# Patient Record
Sex: MALE | Race: WHITE | NOT HISPANIC OR LATINO | Employment: FULL TIME | URBAN - METROPOLITAN AREA
[De-identification: names, ages, dates, MRNs, and addresses within clinical notes are randomized per-mention and may not be internally consistent; named-entity substitution may affect disease eponyms.]

---

## 2017-05-26 ENCOUNTER — TRANSFERRED RECORDS (OUTPATIENT)
Dept: HEALTH INFORMATION MANAGEMENT | Facility: CLINIC | Age: 57
End: 2017-05-26

## 2019-03-13 ENCOUNTER — OFFICE VISIT (OUTPATIENT)
Dept: INTERNAL MEDICINE | Facility: CLINIC | Age: 59
End: 2019-03-13
Payer: COMMERCIAL

## 2019-03-13 VITALS
HEIGHT: 68 IN | HEART RATE: 94 BPM | OXYGEN SATURATION: 96 % | DIASTOLIC BLOOD PRESSURE: 58 MMHG | RESPIRATION RATE: 18 BRPM | SYSTOLIC BLOOD PRESSURE: 104 MMHG | BODY MASS INDEX: 25.4 KG/M2 | TEMPERATURE: 98.5 F | WEIGHT: 167.6 LBS

## 2019-03-13 DIAGNOSIS — B02.9 HERPES ZOSTER WITHOUT COMPLICATION: Primary | ICD-10-CM

## 2019-03-13 PROCEDURE — 99203 OFFICE O/P NEW LOW 30 MIN: CPT | Performed by: NURSE PRACTITIONER

## 2019-03-13 RX ORDER — VALACYCLOVIR HYDROCHLORIDE 1 G/1
1000 TABLET, FILM COATED ORAL 3 TIMES DAILY
Qty: 21 TABLET | Refills: 0 | Status: SHIPPED | OUTPATIENT
Start: 2019-03-13 | End: 2019-04-01

## 2019-03-13 ASSESSMENT — MIFFLIN-ST. JEOR: SCORE: 1554.73

## 2019-03-13 NOTE — NURSING NOTE
"Vital signs:  Temp: 98.5  F (36.9  C) Temp src: Oral BP: 104/58 Pulse: 94   Resp: 18 SpO2: 96 %     Height: 172.7 cm (5' 8\") Weight: 76 kg (167 lb 9.6 oz)  Estimated body mass index is 25.48 kg/m  as calculated from the following:    Height as of this encounter: 1.727 m (5' 8\").    Weight as of this encounter: 76 kg (167 lb 9.6 oz).          "

## 2019-03-13 NOTE — PROGRESS NOTES
"  SUBJECTIVE:   Ryder Zepeda is a 58 year old male who presents to clinic today for the following health issues:        He is a new patient. He has shingles on his left side and notice the rash this morning.        Rash      Duration: 1 day    Description  Location: blistering rash L flank, abd, minor pain.  Since last week, vague fatigue, dull HA  Itching: no    Intensity:  moderate    Accompanying signs and symptoms: None    History (similar episodes/previous evaluation): None    Precipitating or alleviating factors:  New exposures:  None  Recent travel: no      Therapies tried and outcome: none      Problem list and histories reviewed & adjusted, as indicated.  Additional history: as documented        Reviewed and updated as needed this visit by clinical staff  Tobacco  Allergies  Meds  Med Hx  Surg Hx  Fam Hx  Soc Hx      Reviewed and updated as needed this visit by Provider         ROS:  Constitutional, HEENT, cardiovascular, pulmonary, gi and gu systems are negative, except as otherwise noted.    OBJECTIVE:     /58 (BP Location: Right arm, Patient Position: Sitting, Cuff Size: Adult Regular)   Pulse 94   Temp 98.5  F (36.9  C) (Oral)   Resp 18   Ht 1.727 m (5' 8\")   Wt 76 kg (167 lb 9.6 oz)   SpO2 96%   BMI 25.48 kg/m    Body mass index is 25.48 kg/m .  GENERAL: healthy, alert and no distress  SKIN: T11 distribution clusters pink vesicles and papules        ASSESSMENT/PLAN:               ICD-10-CM    1. Herpes zoster without complication B02.9 valACYclovir (VALTREX) 1000 mg tablet       NSAIDs, prn  Consider shingrix vaccine    Lelo Naylor NP  [facility]  "

## 2019-04-01 ENCOUNTER — OFFICE VISIT (OUTPATIENT)
Dept: FAMILY MEDICINE | Facility: CLINIC | Age: 59
End: 2019-04-01
Payer: COMMERCIAL

## 2019-04-01 VITALS
DIASTOLIC BLOOD PRESSURE: 70 MMHG | TEMPERATURE: 97.9 F | BODY MASS INDEX: 25.85 KG/M2 | OXYGEN SATURATION: 97 % | WEIGHT: 170 LBS | SYSTOLIC BLOOD PRESSURE: 124 MMHG | HEART RATE: 77 BPM

## 2019-04-01 DIAGNOSIS — B02.29 POSTHERPETIC NEURALGIA: Primary | ICD-10-CM

## 2019-04-01 PROCEDURE — 99213 OFFICE O/P EST LOW 20 MIN: CPT | Performed by: FAMILY MEDICINE

## 2019-04-01 RX ORDER — GABAPENTIN 300 MG/1
300 CAPSULE ORAL 3 TIMES DAILY
Qty: 90 CAPSULE | Refills: 3 | Status: SHIPPED | OUTPATIENT
Start: 2019-04-01 | End: 2019-10-09

## 2019-04-01 NOTE — PROGRESS NOTES
SUBJECTIVE:                                                    Ryder Zepeda is a 58 year old male who presents to clinic today for the following health issues:      Follow up for shingles - He was seen on 3/13/19 and diagnosed with shingled. He had developed a blistering rash on left flank. Treated with Valtrex. He states the rash is almost gone but has lots of numbness/itching/pain on left flank.    Problem list and histories reviewed & adjusted, as indicated.  Additional history: as documented    Patient Active Problem List   Diagnosis     Shingles     Past Surgical History:   Procedure Laterality Date     deviated septum         Social History     Tobacco Use     Smoking status: Never Smoker     Smokeless tobacco: Never Used   Substance Use Topics     Alcohol use: Yes     Comment: Once a week     Family History   Problem Relation Age of Onset     Leukemia Mother      Hypertension Father            ROS:  Constitutional, HEENT, cardiovascular, pulmonary, gi and gu systems are negative, except as otherwise noted.    OBJECTIVE:     /70   Pulse 77   Temp 97.9  F (36.6  C) (Oral)   Wt 77.1 kg (170 lb)   SpO2 97%   BMI 25.85 kg/m    Body mass index is 25.85 kg/m .  GENERAL: healthy, alert and no distress  SKIN: faint erythematous rash on left flank along T11 dermatome    Diagnostic Test Results:  none     ASSESSMENT/PLAN:   1. Postherpetic neuralgia: treat post-shingles pain with gabapentin. Follow up in one month to see how he is doing and for preventive visit.  - gabapentin (NEURONTIN) 300 MG capsule; Take 1 capsule (300 mg) by mouth 3 times daily  Dispense: 90 capsule; Refill: 3    Faheem Penn DO  [facility]

## 2019-06-19 NOTE — PROGRESS NOTES
"Subjective     Ryder Zepeda is a 59 year old male who presents to clinic today for the following health issues:      Concern - Shingles Follow up   Onset: DX on 03/13/19    Description:   Still having symptoms on left flank     Intensity: moderate    Progression of Symptoms:  improving    Accompanying Signs & Symptoms:  Painful, throbbing     Previous history of similar problem:   None     Precipitating factors:   Worsened by: without meds     Alleviating factors:  Improved by: gabapentin     Therapies Tried and outcome:   Gabapentin - helps but he doesn't like taking medication  CBD oil - no help  Capsaicin - made the area warm but didn't help much  Putting pressure on the area feels better.    If he is moving around and being active, doesn't notice the pain as much. He notices it more when sitting around or in bed.      Patient Active Problem List   Diagnosis     Shingles     Past Surgical History:   Procedure Laterality Date     deviated septum         Social History     Tobacco Use     Smoking status: Never Smoker     Smokeless tobacco: Never Used   Substance Use Topics     Alcohol use: Yes     Comment: Once a week     Family History   Problem Relation Age of Onset     Leukemia Mother      Hypertension Father            Reviewed and updated as needed this visit by Provider  Tobacco  Allergies  Meds  Problems  Med Hx  Surg Hx  Fam Hx         Review of Systems   ROS COMP: Constitutional, HEENT, cardiovascular, pulmonary, gi and gu systems are negative, except as otherwise noted.      Objective    /62   Pulse 73   Temp 98.3  F (36.8  C) (Oral)   Ht 1.727 m (5' 8\")   Wt 76.7 kg (169 lb)   SpO2 95%   BMI 25.70 kg/m    Body mass index is 25.7 kg/m .  Physical Exam   GENERAL: healthy, alert and no distress  SKIN: scattered hyperpigmented areas in region of shingles rash on left side    Diagnostic Test Results:  none         Assessment & Plan     1. Postherpetic neuralgia: discussed nature of PHN " and possible prolonged course of symptoms. Continue gabapentin.          Return in about 1 month (around 7/20/2019) for Preventive Visit.    Faheem Penn,   [facility]

## 2019-06-20 ENCOUNTER — OFFICE VISIT (OUTPATIENT)
Dept: FAMILY MEDICINE | Facility: CLINIC | Age: 59
End: 2019-06-20
Payer: COMMERCIAL

## 2019-06-20 VITALS
DIASTOLIC BLOOD PRESSURE: 62 MMHG | TEMPERATURE: 98.3 F | SYSTOLIC BLOOD PRESSURE: 100 MMHG | OXYGEN SATURATION: 95 % | BODY MASS INDEX: 25.61 KG/M2 | WEIGHT: 169 LBS | HEART RATE: 73 BPM | HEIGHT: 68 IN

## 2019-06-20 DIAGNOSIS — B02.29 POSTHERPETIC NEURALGIA: Primary | ICD-10-CM

## 2019-06-20 PROCEDURE — 99213 OFFICE O/P EST LOW 20 MIN: CPT | Performed by: FAMILY MEDICINE

## 2019-06-20 ASSESSMENT — MIFFLIN-ST. JEOR: SCORE: 1556.08

## 2019-10-09 DIAGNOSIS — B02.29 POSTHERPETIC NEURALGIA: ICD-10-CM

## 2019-10-09 RX ORDER — GABAPENTIN 300 MG/1
300 CAPSULE ORAL 3 TIMES DAILY
Qty: 90 CAPSULE | Refills: 2 | Status: SHIPPED | OUTPATIENT
Start: 2019-10-09 | End: 2023-01-26

## 2019-10-09 NOTE — TELEPHONE ENCOUNTER
gabapentin (NEURONTIN) 300 MG capsule      Last Written Prescription Date:  4/1/2019  Last Fill Quantity: 90 capsule,   # refills: 3  Last Office Visit: 6/20/2019 [facility]    Future Office visit:       Routing refill request to provider for review/approval because:  Drug not on the [facility], [facility] or [facility] refill protocol or controlled substance

## 2019-12-04 ENCOUNTER — HOSPITAL ENCOUNTER (EMERGENCY)
Facility: CLINIC | Age: 59
Discharge: HOME | End: 2019-12-04
Attending: EMERGENCY MEDICINE | Admitting: EMERGENCY MEDICINE
Payer: COMMERCIAL

## 2019-12-04 VITALS
OXYGEN SATURATION: 98 % | TEMPERATURE: 97.4 F | DIASTOLIC BLOOD PRESSURE: 82 MMHG | HEART RATE: 78 BPM | RESPIRATION RATE: 16 BRPM | SYSTOLIC BLOOD PRESSURE: 140 MMHG

## 2019-12-04 DIAGNOSIS — N20.0 KIDNEY STONE: ICD-10-CM

## 2019-12-04 LAB
ALBUMIN UR-MCNC: 10 MG/DL
ANION GAP SERPL CALCULATED.3IONS-SCNC: 8 MMOL/L (ref 3–14)
APPEARANCE UR: CLEAR
BASOPHILS # BLD AUTO: 0 10E9/L (ref 0–0.2)
BASOPHILS NFR BLD AUTO: 0.1 %
BILIRUB UR QL STRIP: NEGATIVE
BUN SERPL-MCNC: 19 MG/DL (ref 7–30)
CALCIUM SERPL-MCNC: 9 MG/DL (ref 8.5–10.1)
CHLORIDE SERPL-SCNC: 108 MMOL/L (ref 94–109)
CO2 SERPL-SCNC: 24 MMOL/L (ref 20–32)
COLOR UR AUTO: ABNORMAL
CREAT SERPL-MCNC: 1.14 MG/DL (ref 0.66–1.25)
DIFFERENTIAL METHOD BLD: ABNORMAL
EOSINOPHIL # BLD AUTO: 0.1 10E9/L (ref 0–0.7)
EOSINOPHIL NFR BLD AUTO: 0.5 %
ERYTHROCYTE [DISTWIDTH] IN BLOOD BY AUTOMATED COUNT: 12.3 % (ref 10–15)
GFR SERPL CREATININE-BSD FRML MDRD: 70 ML/MIN/{1.73_M2}
GLUCOSE SERPL-MCNC: 144 MG/DL (ref 70–99)
GLUCOSE UR STRIP-MCNC: NEGATIVE MG/DL
HCT VFR BLD AUTO: 41.3 % (ref 40–53)
HGB BLD-MCNC: 13.3 G/DL (ref 13.3–17.7)
HGB UR QL STRIP: NEGATIVE
IMM GRANULOCYTES # BLD: 0.1 10E9/L (ref 0–0.4)
IMM GRANULOCYTES NFR BLD: 0.4 %
KETONES UR STRIP-MCNC: ABNORMAL MG/DL
LEUKOCYTE ESTERASE UR QL STRIP: ABNORMAL
LYMPHOCYTES # BLD AUTO: 1.5 10E9/L (ref 0.8–5.3)
LYMPHOCYTES NFR BLD AUTO: 13.5 %
MCH RBC QN AUTO: 30.7 PG (ref 26.5–33)
MCHC RBC AUTO-ENTMCNC: 32.2 G/DL (ref 31.5–36.5)
MCV RBC AUTO: 95 FL (ref 78–100)
MONOCYTES # BLD AUTO: 0.8 10E9/L (ref 0–1.3)
MONOCYTES NFR BLD AUTO: 7.1 %
MUCOUS THREADS #/AREA URNS LPF: PRESENT /LPF
NEUTROPHILS # BLD AUTO: 8.8 10E9/L (ref 1.6–8.3)
NEUTROPHILS NFR BLD AUTO: 78.4 %
NITRATE UR QL: NEGATIVE
NRBC # BLD AUTO: 0 10*3/UL
NRBC BLD AUTO-RTO: 0 /100
PH UR STRIP: 7 PH (ref 5–7)
PLATELET # BLD AUTO: 337 10E9/L (ref 150–450)
POTASSIUM SERPL-SCNC: 4 MMOL/L (ref 3.4–5.3)
RBC # BLD AUTO: 4.33 10E12/L (ref 4.4–5.9)
RBC #/AREA URNS AUTO: 5 /HPF (ref 0–2)
SODIUM SERPL-SCNC: 140 MMOL/L (ref 133–144)
SOURCE: ABNORMAL
SP GR UR STRIP: 1.02 (ref 1–1.03)
UROBILINOGEN UR STRIP-MCNC: NORMAL MG/DL (ref 0–2)
WBC # BLD AUTO: 11.2 10E9/L (ref 4–11)
WBC #/AREA URNS AUTO: 2 /HPF (ref 0–5)

## 2019-12-04 PROCEDURE — 85025 COMPLETE CBC W/AUTO DIFF WBC: CPT | Performed by: EMERGENCY MEDICINE

## 2019-12-04 PROCEDURE — 96374 THER/PROPH/DIAG INJ IV PUSH: CPT

## 2019-12-04 PROCEDURE — 96375 TX/PRO/DX INJ NEW DRUG ADDON: CPT

## 2019-12-04 PROCEDURE — 80048 BASIC METABOLIC PNL TOTAL CA: CPT | Performed by: EMERGENCY MEDICINE

## 2019-12-04 PROCEDURE — 25000128 H RX IP 250 OP 636: Performed by: EMERGENCY MEDICINE

## 2019-12-04 PROCEDURE — 81001 URINALYSIS AUTO W/SCOPE: CPT | Performed by: EMERGENCY MEDICINE

## 2019-12-04 PROCEDURE — 96361 HYDRATE IV INFUSION ADD-ON: CPT

## 2019-12-04 PROCEDURE — 99285 EMERGENCY DEPT VISIT HI MDM: CPT | Mod: 25

## 2019-12-04 PROCEDURE — 25800030 ZZH RX IP 258 OP 636: Performed by: EMERGENCY MEDICINE

## 2019-12-04 RX ORDER — HYDROMORPHONE HYDROCHLORIDE 1 MG/ML
0.5 INJECTION, SOLUTION INTRAMUSCULAR; INTRAVENOUS; SUBCUTANEOUS
Status: DISCONTINUED | OUTPATIENT
Start: 2019-12-04 | End: 2019-12-04 | Stop reason: HOSPADM

## 2019-12-04 RX ORDER — ONDANSETRON 2 MG/ML
4 INJECTION INTRAMUSCULAR; INTRAVENOUS ONCE
Status: DISCONTINUED | OUTPATIENT
Start: 2019-12-04 | End: 2019-12-04 | Stop reason: HOSPADM

## 2019-12-04 RX ORDER — ONDANSETRON 2 MG/ML
4 INJECTION INTRAMUSCULAR; INTRAVENOUS ONCE
Status: COMPLETED | OUTPATIENT
Start: 2019-12-04 | End: 2019-12-04

## 2019-12-04 RX ORDER — HYDROCODONE BITARTRATE AND ACETAMINOPHEN 5; 325 MG/1; MG/1
1 TABLET ORAL EVERY 6 HOURS PRN
Qty: 10 TABLET | Refills: 0 | Status: SHIPPED | OUTPATIENT
Start: 2019-12-04 | End: 2019-12-07

## 2019-12-04 RX ORDER — KETOROLAC TROMETHAMINE 15 MG/ML
15 INJECTION, SOLUTION INTRAMUSCULAR; INTRAVENOUS ONCE
Status: COMPLETED | OUTPATIENT
Start: 2019-12-04 | End: 2019-12-04

## 2019-12-04 RX ADMIN — ONDANSETRON HYDROCHLORIDE 4 MG: 2 INJECTION, SOLUTION INTRAMUSCULAR; INTRAVENOUS at 01:42

## 2019-12-04 RX ADMIN — SODIUM CHLORIDE 1000 ML: 9 INJECTION, SOLUTION INTRAVENOUS at 01:42

## 2019-12-04 RX ADMIN — HYDROMORPHONE HYDROCHLORIDE 0.5 MG: 1 INJECTION, SOLUTION INTRAMUSCULAR; INTRAVENOUS; SUBCUTANEOUS at 01:42

## 2019-12-04 RX ADMIN — KETOROLAC TROMETHAMINE 15 MG: 15 INJECTION, SOLUTION INTRAMUSCULAR; INTRAVENOUS at 01:42

## 2019-12-04 ASSESSMENT — ENCOUNTER SYMPTOMS
VOMITING: 1
FLANK PAIN: 1
HEMATURIA: 0
DYSURIA: 0
NAUSEA: 1

## 2019-12-04 NOTE — ED AVS SNAPSHOT
[facility] Emergency Department  [address]  [facility] [address]  Phone:  [phone]  Fax:  [phone]                                    Ryder Zepeda   MRN: [id number]    Department:  [facility] Emergency Department   Date of Visit:  12/4/2019           After Visit Summary Signature Page    I have received my discharge instructions, and my questions have been answered. I have discussed any challenges I see with this plan with the nurse or doctor.    ..........................................................................................................................................  Patient/Patient Representative Signature      ..........................................................................................................................................  Patient Representative Print Name and Relationship to Patient    ..................................................               ................................................  Date                                   Time    ..........................................................................................................................................  Reviewed by Signature/Title    ...................................................              ..............................................  Date                                               Time          22EPIC Rev 08/18

## 2019-12-04 NOTE — ED PROVIDER NOTES
History     Chief Complaint:  Flank Pain    The history is provided by the patient and the spouse.      Ryder Zepeda is a 59 year old male with a history of kidney stones who presents to the emergency department today for evaluation of right sided flank pain. The patient reports around 2100 tonight he had an onset of right sided flank pain. He states he has had decreased urination but denies any dysuria or hematuria. He has also been nauseated and vomited a few times. His wife adds that the patient has not felt well all week, and tonight he appeared pale. The patient reports his last kidney stone was approximately ten years ago and passed on his own, he states today's symptoms feel reminiscent of his previous stones.    Allergies:  No Known Drug Allergies     Medications:    Gabapentin  Lansoprazole     Past Medical History:    Shingles  Calculus of kidney    Past Surgical History:    Deviated septum    Family History:    Stroke   Leukemia  Hypertension     Social History:  The patient was accompanied to the ED by his wife.  Smoking Status: Never Smoker  Smokeless Tobacco: Never Used  Alcohol Use: Positive, 1/week  Drug Use: Negative    Marital Status:       Review of Systems   Gastrointestinal: Positive for nausea and vomiting.   Genitourinary: Positive for decreased urine volume and flank pain (right). Negative for dysuria and hematuria.   Skin: Positive for pallor (resolved).   All other systems reviewed and are negative.    Physical Exam     Patient Vitals for the past 24 hrs:   BP Temp Temp src Pulse Heart Rate Resp SpO2   12/04/19 0142 (!) 140/82 -- -- 78 78 16 98 %   12/04/19 0128 (!) 138/90 -- -- -- -- -- --   12/04/19 0127 -- 97.4  F (36.3  C) Temporal 89 89 18 95 %      Physical Exam    General: Patient is alert and interactive when I enter the room, comfortable appearing   Head:  The scalp, face, and head appear normal  Eyes:  Conjunctivae are normal  ENT:    The nose is normal    Pinnae are  normal    External acoustic canals are normal  Neck:  Trachea midline  CV:  Pulses are normal.    Resp:  No respiratory distress   Abdomen:      Soft, non-tender, non-distended  Musc:  Normal muscular tone    No major joint effusions    No asymmetric leg swelling  Skin:  No rash or lesions noted  Neuro:  Speech is normal and fluent. Face is symmetric.     Moving all extremities well.   Psych: Awake. Alert.  Normal affect.  Appropriate interactions.    Emergency Department Course     Laboratory:  Laboratory findings were communicated with the patient and family who voiced understanding of the findings.  CBC: WNL (WBC 11.2, HGB 13.3, )  BMP: Glucose 144 (H). O/w WNL (Creatinine 1.14)     UA with Microscopic: Clear, light yellow urine with trace ketones, protein albumin of 10, small Leukocyte esterase, RBC of 5 (H), and mucous present. O/w WNL.       Interventions:  0142 Dilaudid 0.5 mg IV   0142 Toradol 15 mg IV  0142 Zofran 4 mg IV  0142 0.9% NaCl 1L IV     Emergency Department Course:  0146 IV was inserted and blood was drawn for laboratory testing, results above.   0202 Nursing notes and vitals reviewed.  0214 I performed an exam of the patient as documented above.   0235 The patient provided a urine sample here in the emergency department. This was sent for laboratory testing, findings above.   0356 Patient was rechecked and updated with laboratory results, discussed discharge.    Findings and plan explained to the Patient and spouse. Patient discharged home with instructions regarding supportive care, medications, and reasons to return. The importance of close follow-up was reviewed. The patient was prescribed Hydrocodone-Acetaminophen.     I personally reviewed the laboratory results with the Patient and spouse and answered all related questions prior to discharge.      Impression & Plan      Medical Decision Making:  Ryder Zepeda is a 59 year old male who presented with right flank pain consistent  with renal colic. Renal function is normal/baseline. Given that his pain is controlled and stay controlled, do not think patient needs imaging and patient was comfortable with this plan. Lab workup and history show no other alternative etiology that could be causing his symptoms (e.g., AAA, appendicitis, pyelonephritis). There is no fever or convincing evidence of a urinary tract infection. On recheck, his pain is controlled with interventions in the ED and he is tolerating POs. I will prescribe supportive medications to control pain. I have advised him to return for uncontrolled pain, vomiting, fever, or any other concerning symptoms. I also advised to strain his urine to look for a stone.  Finally, I have advised follow up with urology within 3-5 days.      Diagnosis:    ICD-10-CM    1. Kidney stone N20.0        Disposition:  The patient is discharged to home.     Discharge Medications:  New Prescriptions    HYDROCODONE-ACETAMINOPHEN (NORCO) 5-325 MG TABLET    Take 1 tablet by mouth every 6 hours as needed for severe pain       Scribe Disclosure:  IMarlen, am serving as a scribe at 2:09 AM on 12/4/2019 to document services personally performed by Sharmin Wing MD based on my observations and the provider's statements to me.    12/4/2019   [facility] EMERGENCY DEPARTMENT       Sharmin Wing MD  12/04/19 8011

## 2020-10-08 NOTE — PROGRESS NOTES
Subjective     Ryder Zepeda is a 60 year old male who presents to clinic today for the following health issues:    History of Present Illness       Back Pain:  He presents for follow up of back pain. Patient's back pain is a new problem.    Original cause of back pain: not sure  First noticed back pain: in the last week  Patient feels back pain: comes and goesLocation of back pain:  Left lower back  Description of back pain: dull ache  Back pain spreads: nowhere    Since patient first noticed back pain, pain is: unchanged  Does back pain interfere with his job:  No  On a scale of 1-10 (10 being the worst), patient describes pain as:  3  What makes back pain worse: other  Acupuncture: not tried  Acetaminophen: helpful  Activity or exercise: not tried  Chiropractor:  Not tried  Cold: not tried  Heat: not tried  Massage: not tried  Muscle relaxants: not tried  NSAIDS: helpful  Opioids: not tried  Physical Therapy: not tried  Rest: helpful  Steroid Injection: not tried  Stretching: not tried  Surgery: not tried  TENS unit: not tried  Topical pain relievers: not tried  Other healthcare providers patient is seeing for back pain: None    He eats 2-3 servings of fruits and vegetables daily.He consumes 2 sweetened beverage(s) daily.He exercises with enough effort to increase his heart rate 10 to 19 minutes per day.  He exercises with enough effort to increase his heart rate 3 or less days per week.   He is taking medications regularly.           Started having pain in his left hip about 2 weeks ago. He decreased dairy products in his diet and still had hip pain and bilateral foot pain. Now he is having middle low back pain. Hip and feet pain are resolved. No change in activity. Feels more pain if he is lifting more weight.       Review of Systems   Constitutional, HEENT, cardiovascular, pulmonary, gi and gu systems are negative, except as otherwise noted.      Objective    /80 (BP Location: Right arm, Cuff Size:  "Adult Regular)   Pulse 81   Temp 98.1  F (36.7  C) (Tympanic)   Ht 1.727 m (5' 8\")   Wt 74.8 kg (165 lb)   SpO2 98%   BMI 25.09 kg/m    Body mass index is 25.09 kg/m .  Physical Exam   GENERAL: healthy, alert and no distress  Comprehensive back pain exam:  No tenderness, Range of motion not limited by pain, Lower extremity strength functional and equal on both sides and Straight leg raise negative bilaterally          Assessment & Plan     Acute right-sided low back pain without sciatica: seems muscular in nature. Discussed conservative management.  If any persistence or worsening of pain, consider imaging and or referral to physical therapy.    Urinary frequency: unclear if related to low back pain but doesn't seem likely. Will check blood work, urine for further evaluatoin.  - *UA reflex to Microscopic and Culture ([place] and [facility] (except [place] and [place])  - Comprehensive metabolic panel (BMP + Alb, Alk Phos, ALT, AST, Total. Bili, TP)  - CBC with platelets  - Hemoglobin A1c  - PSA, screen  - Urine Microscopic    Need for prophylactic vaccination and inoculation against influenza  - INFLUENZA QUAD, RECOMBINANT, P-FREE (RIV4) (FLUBLOCK) [84219]  - Vaccine Administration, Initial [49953]     BMI:   Estimated body mass index is 25.09 kg/m  as calculated from the following:    Height as of this encounter: 1.727 m (5' 8\").    Weight as of this encounter: 74.8 kg (165 lb).            Return if symptoms worsen or fail to improve.    Faheem Penn DO  [facility]    "

## 2020-10-09 ENCOUNTER — OFFICE VISIT (OUTPATIENT)
Dept: FAMILY MEDICINE | Facility: CLINIC | Age: 60
End: 2020-10-09
Payer: COMMERCIAL

## 2020-10-09 VITALS
DIASTOLIC BLOOD PRESSURE: 80 MMHG | HEIGHT: 68 IN | WEIGHT: 165 LBS | HEART RATE: 81 BPM | OXYGEN SATURATION: 98 % | TEMPERATURE: 98.1 F | BODY MASS INDEX: 25.01 KG/M2 | SYSTOLIC BLOOD PRESSURE: 112 MMHG

## 2020-10-09 DIAGNOSIS — R35.0 URINARY FREQUENCY: ICD-10-CM

## 2020-10-09 DIAGNOSIS — Z23 NEED FOR PROPHYLACTIC VACCINATION AND INOCULATION AGAINST INFLUENZA: ICD-10-CM

## 2020-10-09 DIAGNOSIS — M54.50 ACUTE RIGHT-SIDED LOW BACK PAIN WITHOUT SCIATICA: Primary | ICD-10-CM

## 2020-10-09 LAB
ALBUMIN SERPL-MCNC: 3.7 G/DL (ref 3.4–5)
ALBUMIN UR-MCNC: ABNORMAL MG/DL
ALP SERPL-CCNC: 92 U/L (ref 40–150)
ALT SERPL W P-5'-P-CCNC: 28 U/L (ref 0–70)
ANION GAP SERPL CALCULATED.3IONS-SCNC: 6 MMOL/L (ref 3–14)
APPEARANCE UR: CLEAR
AST SERPL W P-5'-P-CCNC: 21 U/L (ref 0–45)
BACTERIA #/AREA URNS HPF: ABNORMAL /HPF
BILIRUB SERPL-MCNC: 0.3 MG/DL (ref 0.2–1.3)
BILIRUB UR QL STRIP: NEGATIVE
BUN SERPL-MCNC: 12 MG/DL (ref 7–30)
CALCIUM SERPL-MCNC: 9.2 MG/DL (ref 8.5–10.1)
CHLORIDE SERPL-SCNC: 108 MMOL/L (ref 94–109)
CO2 SERPL-SCNC: 24 MMOL/L (ref 20–32)
COLOR UR AUTO: YELLOW
CREAT SERPL-MCNC: 0.94 MG/DL (ref 0.66–1.25)
ERYTHROCYTE [DISTWIDTH] IN BLOOD BY AUTOMATED COUNT: 12.2 % (ref 10–15)
GFR SERPL CREATININE-BSD FRML MDRD: 87 ML/MIN/{1.73_M2}
GLUCOSE SERPL-MCNC: 90 MG/DL (ref 70–99)
GLUCOSE UR STRIP-MCNC: NEGATIVE MG/DL
HBA1C MFR BLD: 5 % (ref 0–5.6)
HCT VFR BLD AUTO: 42.1 % (ref 40–53)
HGB BLD-MCNC: 13.9 G/DL (ref 13.3–17.7)
HGB UR QL STRIP: ABNORMAL
KETONES UR STRIP-MCNC: NEGATIVE MG/DL
LEUKOCYTE ESTERASE UR QL STRIP: NEGATIVE
MCH RBC QN AUTO: 31.3 PG (ref 26.5–33)
MCHC RBC AUTO-ENTMCNC: 33 G/DL (ref 31.5–36.5)
MCV RBC AUTO: 95 FL (ref 78–100)
MUCOUS THREADS #/AREA URNS LPF: PRESENT /LPF
NITRATE UR QL: NEGATIVE
NON-SQ EPI CELLS #/AREA URNS LPF: ABNORMAL /LPF
PH UR STRIP: 6 PH (ref 5–7)
PLATELET # BLD AUTO: 308 10E9/L (ref 150–450)
POTASSIUM SERPL-SCNC: 4.3 MMOL/L (ref 3.4–5.3)
PROT SERPL-MCNC: 7.7 G/DL (ref 6.8–8.8)
PSA SERPL-ACNC: 1.41 UG/L (ref 0–4)
RBC # BLD AUTO: 4.44 10E12/L (ref 4.4–5.9)
RBC #/AREA URNS AUTO: ABNORMAL /HPF
SODIUM SERPL-SCNC: 138 MMOL/L (ref 133–144)
SOURCE: ABNORMAL
SP GR UR STRIP: 1.02 (ref 1–1.03)
SPERM #/AREA URNS HPF: PRESENT /HPF
UROBILINOGEN UR STRIP-ACNC: 0.2 EU/DL (ref 0.2–1)
WBC # BLD AUTO: 6.9 10E9/L (ref 4–11)
WBC #/AREA URNS AUTO: ABNORMAL /HPF

## 2020-10-09 PROCEDURE — 83036 HEMOGLOBIN GLYCOSYLATED A1C: CPT | Performed by: FAMILY MEDICINE

## 2020-10-09 PROCEDURE — G0103 PSA SCREENING: HCPCS | Performed by: FAMILY MEDICINE

## 2020-10-09 PROCEDURE — 99213 OFFICE O/P EST LOW 20 MIN: CPT | Mod: 25 | Performed by: FAMILY MEDICINE

## 2020-10-09 PROCEDURE — 80053 COMPREHEN METABOLIC PANEL: CPT | Performed by: FAMILY MEDICINE

## 2020-10-09 PROCEDURE — 90471 IMMUNIZATION ADMIN: CPT | Performed by: FAMILY MEDICINE

## 2020-10-09 PROCEDURE — 36415 COLL VENOUS BLD VENIPUNCTURE: CPT | Performed by: FAMILY MEDICINE

## 2020-10-09 PROCEDURE — 90682 RIV4 VACC RECOMBINANT DNA IM: CPT | Performed by: FAMILY MEDICINE

## 2020-10-09 PROCEDURE — 81001 URINALYSIS AUTO W/SCOPE: CPT | Performed by: FAMILY MEDICINE

## 2020-10-09 PROCEDURE — 85027 COMPLETE CBC AUTOMATED: CPT | Performed by: FAMILY MEDICINE

## 2020-10-09 ASSESSMENT — MIFFLIN-ST. JEOR: SCORE: 1532.94

## 2020-10-09 NOTE — LETTER
October 12, 2020      Ryder Zepeda  [address] [place] [id number]        Dear ,    We are writing to inform you of your test results.    -Normal red blood cell (hgb) levels, normal white blood cell count and normal platelet levels.   -PSA (prostate specific antigen) test is normal.  This indicates a low likelihood of prostate cancer.  ADVISE: rechecking this in 1 year.   -Liver and gallbladder tests are normal (ALT,AST, Alk phos, bilirubin), kidney function is normal (Cr, GFR), sodium is normal, potassium is normal, calcium is normal, glucose is normal.   -A1C (diabetic test) is normal and indicates that your blood sugar has been in a normal range the last 3 months.     Resulted Orders   *UA reflex to Microscopic and Culture ([place] and [facility] (except [place] and [place])   Result Value Ref Range    Color Urine Yellow     Appearance Urine Clear     Glucose Urine Negative NEG^Negative mg/dL    Bilirubin Urine Negative NEG^Negative    Ketones Urine Negative NEG^Negative mg/dL    Specific Gravity Urine 1.020 1.003 - 1.035    Blood Urine Small (A) NEG^Negative    pH Urine 6.0 5.0 - 7.0 pH    Protein Albumin Urine Trace (A) NEG^Negative mg/dL    Urobilinogen Urine 0.2 0.2 - 1.0 EU/dL    Nitrite Urine Negative NEG^Negative    Leukocyte Esterase Urine Negative NEG^Negative    Source Midstream Urine    Comprehensive metabolic panel (BMP + Alb, Alk Phos, ALT, AST, Total. Bili, TP)   Result Value Ref Range    Sodium 138 133 - 144 mmol/L    Potassium 4.3 3.4 - 5.3 mmol/L    Chloride 108 94 - 109 mmol/L    Carbon Dioxide 24 20 - 32 mmol/L    Anion Gap 6 3 - 14 mmol/L    Glucose 90 70 - 99 mg/dL    Urea Nitrogen 12 7 - 30 mg/dL    Creatinine 0.94 0.66 - 1.25 mg/dL    GFR Estimate 87 >60 mL/min/[1.73_m2]      Comment:      Non  GFR Calc  Starting 12/18/2018, serum creatinine based estimated GFR (eGFR) will be   calculated using the Chronic Kidney Disease Epidemiology Collaboration    (CKD-EPI) equation.      GFR Estimate If Black >90 >60 mL/min/[1.73_m2]      Comment:       GFR Calc  Starting 12/18/2018, serum creatinine based estimated GFR (eGFR) will be   calculated using the Chronic Kidney Disease Epidemiology Collaboration   (CKD-EPI) equation.      Calcium 9.2 8.5 - 10.1 mg/dL    Bilirubin Total 0.3 0.2 - 1.3 mg/dL    Albumin 3.7 3.4 - 5.0 g/dL    Protein Total 7.7 6.8 - 8.8 g/dL    Alkaline Phosphatase 92 40 - 150 U/L    ALT 28 0 - 70 U/L    AST 21 0 - 45 U/L   CBC with platelets   Result Value Ref Range    WBC 6.9 4.0 - 11.0 10e9/L    RBC Count 4.44 4.4 - 5.9 10e12/L    Hemoglobin 13.9 13.3 - 17.7 g/dL    Hematocrit 42.1 40.0 - 53.0 %    MCV 95 78 - 100 fl    MCH 31.3 26.5 - 33.0 pg    MCHC 33.0 31.5 - 36.5 g/dL    RDW 12.2 10.0 - 15.0 %    Platelet Count 308 150 - 450 10e9/L   Hemoglobin A1c   Result Value Ref Range    Hemoglobin A1C 5.0 0 - 5.6 %      Comment:      Normal <5.7% Prediabetes 5.7-6.4%  Diabetes 6.5% or higher - adopted from ADA   consensus guidelines.     PSA, screen   Result Value Ref Range    PSA 1.41 0 - 4 ug/L      Comment:      Assay Method:  Chemiluminescence using Siemens Vista analyzer   Urine Microscopic   Result Value Ref Range    WBC Urine 0 - 5 OTO5^0 - 5 /HPF    RBC Urine O - 2 OTO2^O - 2 /HPF    Squamous Epithelial /LPF Urine Few FEW^Few /LPF    Bacteria Urine Few (A) NEG^Negative /HPF    Mucous Urine Present (A) NEG^Negative /LPF    sperm Present (A) NEG^Negative /HPF       If you have any questions or concerns, please call the clinic at the number listed above.       Sincerely,        Faheem Penn, DO

## 2023-01-26 ENCOUNTER — TELEPHONE (OUTPATIENT)
Dept: FAMILY MEDICINE | Facility: CLINIC | Age: 63
End: 2023-01-26

## 2023-01-26 ENCOUNTER — OFFICE VISIT (OUTPATIENT)
Dept: FAMILY MEDICINE | Facility: CLINIC | Age: 63
End: 2023-01-26
Payer: COMMERCIAL

## 2023-01-26 VITALS
HEIGHT: 67 IN | DIASTOLIC BLOOD PRESSURE: 60 MMHG | TEMPERATURE: 96.9 F | OXYGEN SATURATION: 98 % | HEART RATE: 77 BPM | BODY MASS INDEX: 25.9 KG/M2 | RESPIRATION RATE: 15 BRPM | SYSTOLIC BLOOD PRESSURE: 118 MMHG | WEIGHT: 165 LBS

## 2023-01-26 DIAGNOSIS — Z01.818 PREOPERATIVE EXAMINATION: Primary | ICD-10-CM

## 2023-01-26 DIAGNOSIS — Z13.220 SCREENING FOR HYPERLIPIDEMIA: ICD-10-CM

## 2023-01-26 DIAGNOSIS — L30.9 ECZEMA, UNSPECIFIED TYPE: ICD-10-CM

## 2023-01-26 DIAGNOSIS — Z86.0100 HISTORY OF COLONIC POLYPS: ICD-10-CM

## 2023-01-26 DIAGNOSIS — Z12.5 SCREENING FOR PROSTATE CANCER: ICD-10-CM

## 2023-01-26 DIAGNOSIS — Z13.1 SCREENING FOR DIABETES MELLITUS: ICD-10-CM

## 2023-01-26 DIAGNOSIS — K57.30 DIVERTICULAR DISEASE OF LARGE INTESTINE: ICD-10-CM

## 2023-01-26 PROBLEM — E78.5 HYPERLIPIDEMIA: Status: ACTIVE | Noted: 2023-01-26

## 2023-01-26 PROCEDURE — 99214 OFFICE O/P EST MOD 30 MIN: CPT | Performed by: FAMILY MEDICINE

## 2023-01-26 RX ORDER — TRIAMCINOLONE ACETONIDE 1 MG/G
OINTMENT TOPICAL 2 TIMES DAILY
Qty: 80 G | Refills: 1 | Status: SHIPPED | OUTPATIENT
Start: 2023-01-26 | End: 2023-01-26

## 2023-01-26 RX ORDER — TRIAMCINOLONE ACETONIDE 1 MG/G
OINTMENT TOPICAL 2 TIMES DAILY
Qty: 80 G | Refills: 1 | Status: SHIPPED | OUTPATIENT
Start: 2023-01-26 | End: 2023-02-09

## 2023-01-26 NOTE — TELEPHONE ENCOUNTER
[facility] pharmacy calling asking for clarification re: Kenalog cream. They need either an estimated time frame or an area to be applied. Please advise.    Hernan Hong RN [facility]

## 2023-01-26 NOTE — PROGRESS NOTES
[address] [address]  Phone: [phone]  Primary Provider: Faheem Penn  Pre-op Performing Provider: FAHEEM PENN      PREOPERATIVE EVALUATION:  Today's date: 1/26/2023    Ryder Zepeda is a 62 year old male who presents for a preoperative evaluation.      Surgical Information:  Surgery/Procedure: Colonoscopy  Surgery Location: [facility]  Surgeon:   Surgery Date: 02/03/2023  Time of Surgery: 8:30 AM  Where patient plans to recover: At home with family  Fax number for surgical facility: [facility]    Type of Anesthesia Anticipated: to be determined    Assessment & Plan     The proposed surgical procedure is considered LOW risk.    Preoperative examination    History of colonic polyps    Diverticular disease of large intestine    Eczema, unspecified type       Risks and Recommendations:  The patient has the following additional risks and recommendations for perioperative complications:   - No identified additional risk factors other than previously addressed    Medication Instructions:  Patient is on no chronic medications    RECOMMENDATION:  APPROVAL GIVEN to proceed with proposed procedure, without further diagnostic evaluation.      Subjective     HPI related to upcoming procedure: history of colon polpys    Preop Questions 1/26/2023   1. Have you ever had a heart attack or stroke? No   2. Have you ever had surgery on your heart or blood vessels, such as a stent placement, a coronary artery bypass, or surgery on an artery in your head, neck, heart, or legs? No   3. Do you have chest pain with activity? No   4. Do you have a history of  heart failure? No   5. Do you currently have a cold, bronchitis or symptoms of other infection? No   6. Do you have a cough, shortness of breath, or wheezing? No   7. Do you or anyone in your family have previous history of blood clots? No   8. Do you or does anyone in your family have a serious  bleeding problem such as prolonged bleeding following surgeries or cuts? No   9. Have you ever had problems with anemia or been told to take iron pills? No   10. Have you had any abnormal blood loss such as black, tarry or bloody stools? No   11. Have you ever had a blood transfusion? No   12. Are you willing to have a blood transfusion if it is medically needed before, during, or after your surgery? Yes   13. Have you or any of your relatives ever had problems with anesthesia? No   14. Do you have sleep apnea, excessive snoring or daytime drowsiness? No   15. Do you have any artifical heart valves or other implanted medical devices like a pacemaker, defibrillator, or continuous glucose monitor? No   16. Do you have artificial joints? No   17. Are you allergic to latex? No       Health Care Directive:  Patient does not have a Health Care Directive or Living Will: Patient states has Advance Directive and will bring in a copy to clinic.    Preoperative Review of :   reviewed - no record of controlled substances prescribed.      Status of Chronic Conditions:  See problem list for active medical problems.  Problems all longstanding and stable, except as noted/documented.  See ROS for pertinent symptoms related to these conditions.      Review of Systems  CONSTITUTIONAL: NEGATIVE for fever, chills, change in weight  INTEGUMENTARY/SKIN: NEGATIVE for worrisome rashes, moles or lesions  EYES: NEGATIVE for vision changes or irritation  ENT/MOUTH: NEGATIVE for ear, mouth and throat problems  RESP: NEGATIVE for significant cough or SOB  CV: NEGATIVE for chest pain, palpitations or peripheral edema  GI: NEGATIVE for nausea, abdominal pain, heartburn, or change in bowel habits  : NEGATIVE for frequency, dysuria, or hematuria  MUSCULOSKELETAL: NEGATIVE for significant arthralgias or myalgia  NEURO: NEGATIVE for weakness, dizziness or paresthesias  ENDOCRINE: NEGATIVE for temperature intolerance, skin/hair changes  HEME:  "NEGATIVE for bleeding problems  PSYCHIATRIC: NEGATIVE for changes in mood or affect    Patient Active Problem List    Diagnosis Date Noted     Hyperlipidemia 01/26/2023     Priority: Medium     Formatting of this note might be different from the original.  FLP 3/7/07: , , LDL 48,        Shingles      Priority: Medium     History of colonic polyps 05/26/2017     Priority: Medium     Diverticular disease of large intestine 05/26/2017     Priority: Medium      Past Medical History:   Diagnosis Date     Shingles      Past Surgical History:   Procedure Laterality Date     deviated septum       Current Outpatient Medications   Medication Sig Dispense Refill     Lansoprazole (PREVACID PO) Take 1 tablet by mouth daily as needed         No Known Allergies     Social History     Tobacco Use     Smoking status: Never     Smokeless tobacco: Never   Substance Use Topics     Alcohol use: Yes     Comment: Once a week     Family History   Problem Relation Age of Onset     Leukemia Mother      Hypertension Father      History   Drug Use No         Objective     /60 (BP Location: Right arm, Patient Position: Sitting, Cuff Size: Adult Regular)   Pulse 77   Temp 96.9  F (36.1  C) (Tympanic)   Resp 15   Ht 1.708 m (5' 7.25\")   Wt 74.8 kg (165 lb)   SpO2 98%   BMI 25.65 kg/m      Physical Exam    GENERAL APPEARANCE: healthy, alert and no distress     EYES: EOMI,  PERRL     HENT: ear canals and TM's normal and nose and mouth without ulcers or lesions     NECK: no adenopathy, no asymmetry, masses, or scars and thyroid normal to palpation     RESP: lungs clear to auscultation - no rales, rhonchi or wheezes     CV: regular rates and rhythm, normal S1 S2, no S3 or S4 and no murmur, click or rub     MS: extremities normal- no gross deformities noted, no evidence of inflammation in joints, FROM in all extremities.     SKIN: no suspicious lesions or rashes     NEURO: Normal strength and tone, sensory exam grossly " normal, mentation intact and speech normal     PSYCH: mentation appears normal. and affect normal/bright     LYMPHATICS: No cervical adenopathy      Diagnostics:  No labs were ordered during this visit.   No EKG required, no history of coronary heart disease, significant arrhythmia, peripheral arterial disease or other structural heart disease.    Revised Cardiac Risk Index (RCRI):  The patient has the following serious cardiovascular risks for perioperative complications:   - No serious cardiac risks = 0 points     RCRI Interpretation: 0 points: Class I (very low risk - 0.4% complication rate)           Signed Electronically by: Faheem Penn DO  Copy of this evaluation report is provided to requesting physician.

## 2023-02-03 ENCOUNTER — TRANSFERRED RECORDS (OUTPATIENT)
Dept: HEALTH INFORMATION MANAGEMENT | Facility: CLINIC | Age: 63
End: 2023-02-03
Payer: COMMERCIAL

## 2023-07-25 ENCOUNTER — TELEPHONE (OUTPATIENT)
Dept: FAMILY MEDICINE | Facility: CLINIC | Age: 63
End: 2023-07-25

## 2023-07-25 ENCOUNTER — HOSPITAL ENCOUNTER (OUTPATIENT)
Dept: CT IMAGING | Facility: CLINIC | Age: 63
Discharge: HOME | End: 2023-07-25
Attending: FAMILY MEDICINE | Admitting: FAMILY MEDICINE
Payer: COMMERCIAL

## 2023-07-25 ENCOUNTER — OFFICE VISIT (OUTPATIENT)
Dept: FAMILY MEDICINE | Facility: CLINIC | Age: 63
End: 2023-07-25
Payer: COMMERCIAL

## 2023-07-25 VITALS
BODY MASS INDEX: 25.03 KG/M2 | TEMPERATURE: 98.4 F | OXYGEN SATURATION: 98 % | DIASTOLIC BLOOD PRESSURE: 60 MMHG | RESPIRATION RATE: 15 BRPM | WEIGHT: 161 LBS | HEART RATE: 82 BPM | SYSTOLIC BLOOD PRESSURE: 116 MMHG

## 2023-07-25 DIAGNOSIS — R10.9 FLANK PAIN: ICD-10-CM

## 2023-07-25 DIAGNOSIS — R31.29 MICROSCOPIC HEMATURIA: ICD-10-CM

## 2023-07-25 DIAGNOSIS — R10.9 FLANK PAIN: Primary | ICD-10-CM

## 2023-07-25 DIAGNOSIS — R39.9 UTI SYMPTOMS: ICD-10-CM

## 2023-07-25 LAB
ALBUMIN UR-MCNC: 30 MG/DL
APPEARANCE UR: CLEAR
BACTERIA #/AREA URNS HPF: ABNORMAL /HPF
BILIRUB UR QL STRIP: NEGATIVE
COLOR UR AUTO: YELLOW
ERYTHROCYTE [DISTWIDTH] IN BLOOD BY AUTOMATED COUNT: 12.4 % (ref 10–15)
GLUCOSE UR STRIP-MCNC: NEGATIVE MG/DL
HCT VFR BLD AUTO: 39.6 % (ref 40–53)
HGB BLD-MCNC: 13.3 G/DL (ref 13.3–17.7)
HGB UR QL STRIP: ABNORMAL
KETONES UR STRIP-MCNC: NEGATIVE MG/DL
LEUKOCYTE ESTERASE UR QL STRIP: NEGATIVE
MCH RBC QN AUTO: 31.4 PG (ref 26.5–33)
MCHC RBC AUTO-ENTMCNC: 33.6 G/DL (ref 31.5–36.5)
MCV RBC AUTO: 94 FL (ref 78–100)
NITRATE UR QL: NEGATIVE
PH UR STRIP: 6 [PH] (ref 5–7)
PLATELET # BLD AUTO: 314 10E3/UL (ref 150–450)
RBC # BLD AUTO: 4.23 10E6/UL (ref 4.4–5.9)
RBC #/AREA URNS AUTO: ABNORMAL /HPF
SP GR UR STRIP: 1.02 (ref 1–1.03)
SQUAMOUS #/AREA URNS AUTO: ABNORMAL /LPF
UROBILINOGEN UR STRIP-ACNC: 0.2 E.U./DL
WBC # BLD AUTO: 7.9 10E3/UL (ref 4–11)
WBC #/AREA URNS AUTO: ABNORMAL /HPF

## 2023-07-25 PROCEDURE — 99214 OFFICE O/P EST MOD 30 MIN: CPT | Performed by: FAMILY MEDICINE

## 2023-07-25 PROCEDURE — 81001 URINALYSIS AUTO W/SCOPE: CPT | Performed by: FAMILY MEDICINE

## 2023-07-25 PROCEDURE — 85027 COMPLETE CBC AUTOMATED: CPT | Performed by: FAMILY MEDICINE

## 2023-07-25 PROCEDURE — 36415 COLL VENOUS BLD VENIPUNCTURE: CPT | Performed by: FAMILY MEDICINE

## 2023-07-25 PROCEDURE — 74176 CT ABD & PELVIS W/O CONTRAST: CPT

## 2023-07-25 RX ORDER — CYCLOBENZAPRINE HCL 5 MG
5-10 TABLET ORAL 3 TIMES DAILY PRN
Qty: 30 TABLET | Refills: 0 | Status: SHIPPED | OUTPATIENT
Start: 2023-07-25 | End: 2023-10-17

## 2023-07-25 NOTE — PROGRESS NOTES
"  Assessment & Plan     UTI symptoms    - UA Macroscopic with reflex to Microscopic and Culture - Clinic Collect  - UA Microscopic with Reflex to Culture    Flank pain  Suspicious that symptoms are more musculoskeletal in nature due to location of pain and the affect that movement has on the pain. Will check urinalysis. If positive for blood, would pursue CT abdomen pelvis for further evaluation given history of stones.  - UA Macroscopic with reflex to Microscopic and Culture - Clinic Collect  - cyclobenzaprine (FLEXERIL) 5 MG tablet; Take 1-2 tablets (5-10 mg) by mouth 3 times daily as needed for muscle spasms  - CBC with platelets; Future  - CBC with platelets  - UA Microscopic with Reflex to Culture  - CT Abdomen Pelvis w/o Contrast; Future    Microscopic hematuria  - CT Abdomen Pelvis w/o Contrast; Future             BMI:   Estimated body mass index is 25.03 kg/m  as calculated from the following:    Height as of 1/26/23: 1.708 m (5' 7.25\").    Weight as of this encounter: 73 kg (161 lb).       DO BELIN[facility] DARIEN Soler is a 63 year old, presenting for the following health issues:    Flank Pain (Right side, patient questioning kidney stone)        7/25/2023     2:12 PM   Additional Questions   Roomed by Arya TREVINO CMA     History of Present Illness       Back Pain:  He presents for follow up of back pain. Patient's back pain is a new problem.    Original cause of back pain: not sure  First noticed back pain: yesterday  Patient feels back pain: comes and goesLocation of back pain:  Right lower back  Description of back pain: dull ache  Back pain spreads: nowhere    Since patient first noticed back pain, pain is: always present, but gets better and worse  Does back pain interfere with his job:  No  On a scale of 1-10 (10 being the worst), patient describes pain as:  3  What makes back pain worse: bending, coughing and certain positions   Acetaminophen: " helpful  Activity or exercise: not tried  Chiropractor:  Not tried  Cold: not tried  Heat: not tried  Massage: not tried  Physical Therapy: not tried  Rest: not tried  TENS unit: not tried  Topical pain relievers: helpful  Other healthcare providers patient is seeing for back pain: None    He eats 2-3 servings of fruits and vegetables daily.He consumes 2 sweetened beverage(s) daily.He exercises with enough effort to increase his heart rate 10 to 19 minutes per day.  He exercises with enough effort to increase his heart rate 3 or less days per week.   He is taking medications regularly.       Genitourinary - Male  Onset/Duration: x 1 day  Description: Patient may have drank more soda pop then he usually does and was also burping and heartburn.  Dysuria (painful urination): No  Hematuria (blood in urine): No  Frequency: YES  Waking at night to urinate: YES  Hesitancy (delay in urine): YES  Retention (unable to empty): YES  Decrease in urinary flow: YES  Incontinence: No  Progression of Symptoms:  worsening and constant  Accompanying Signs & Symptoms:  Fever: No  Back/Flank pain: YES- Right side and lower  Urethral discharge: No  Testicle lumps/masses/pain: No  Nausea and/or vomiting: No  Abdominal pain: No  History:   History of frequent UTI s: No  History of kidney stones: YES- multiple times in the past years  History of hernias: No  Personal or Family history of Prostate problems: No  Sexually active: YES  Precipitating or alleviating factors: None  Therapies tried and outcome: increase fluid intake and Tylenol    Symptoms started last night when he was laying in bed but may have even started the day before that. Took Tylenol this AM and symptoms did feel okay but then started hurting again around noon. Certain movements, putting shoes on tend to make symptoms worse. Pain is across the whole low back (R>L).       Review of Systems   Constitutional, HEENT, cardiovascular, pulmonary, gi and gu systems are negative,  except as otherwise noted.      Objective    /60 (BP Location: Left arm, Patient Position: Sitting, Cuff Size: Adult Regular)   Pulse 82   Temp 98.4  F (36.9  C) (Tympanic)   Resp 15   Wt 73 kg (161 lb)   SpO2 98%   BMI 25.03 kg/m    Body mass index is 25.03 kg/m .  Physical Exam   GENERAL: healthy, alert and no distress  RESP: lungs clear to auscultation - no rales, rhonchi or wheezes  CV: regular rates and rhythm, normal S1 S2, no S3 or S4, and no murmur, click or rub  ABDOMEN: soft, nontender, without hepatosplenomegaly or masses  MS: no gross musculoskeletal defects noted, no edema    Results for orders placed or performed during the hospital encounter of 07/25/23   CT Abdomen Pelvis w/o Contrast     Status: None    Narrative    EXAM: CT ABDOMEN PELVIS W/O CONTRAST  LOCATION: [facility]  DATE: 7/25/2023    INDICATION: flank pain, microscopic hematuria   evaluate for stone  COMPARISON: None.  TECHNIQUE: CT scan of the abdomen and pelvis was performed without IV contrast. Multiplanar reformats were obtained. Dose reduction techniques were used.  CONTRAST: None.    FINDINGS:   LOWER CHEST: Small esophageal hiatal hernia.    HEPATOBILIARY: Normal.    PANCREAS: Normal.    SPLEEN: Normal.    ADRENAL GLANDS: Normal.    KIDNEYS/BLADDER: 7 nonobstructing calyceal tip stones right kidney, the largest measuring 4 mm. Small right renal cyst. Large nonobstructing stone upper pole calyx left kidney measuring 10 x 7 mm. 1 mm calyceal tip stone lower pole left kidney. No   bladder stones.    BOWEL: Diverticula sigmoid colon, without evidence for diverticulitis or bowel obstruction.    LYMPH NODES: Normal.    VASCULATURE: Unremarkable.    PELVIC ORGANS: Prostatic hypertrophy.    MUSCULOSKELETAL: Degenerative disc disease L2 level.       Impression    IMPRESSION:   1.  Multiple nonobstructing stones both kidneys.  2.  Small esophageal hiatal hernia.  3.  Sigmoid diverticulosis.  4.  Prostatic  hypertrophy.     Results for orders placed or performed in visit on 07/25/23   UA Macroscopic with reflex to Microscopic and Culture - Clinic Collect     Status: Abnormal    Specimen: Urine, Clean Catch   Result Value Ref Range    Color Urine Yellow Colorless, Straw, Light Yellow, Yellow    Appearance Urine Clear Clear    Glucose Urine Negative Negative mg/dL    Bilirubin Urine Negative Negative    Ketones Urine Negative Negative mg/dL    Specific Gravity Urine 1.020 1.003 - 1.035    Blood Urine Moderate (A) Negative    pH Urine 6.0 5.0 - 7.0    Protein Albumin Urine 30 (A) Negative mg/dL    Urobilinogen Urine 0.2 0.2, 1.0 E.U./dL    Nitrite Urine Negative Negative    Leukocyte Esterase Urine Negative Negative   CBC with platelets     Status: Abnormal   Result Value Ref Range    WBC Count 7.9 4.0 - 11.0 10e3/uL    RBC Count 4.23 (L) 4.40 - 5.90 10e6/uL    Hemoglobin 13.3 13.3 - 17.7 g/dL    Hematocrit 39.6 (L) 40.0 - 53.0 %    MCV 94 78 - 100 fL    MCH 31.4 26.5 - 33.0 pg    MCHC 33.6 31.5 - 36.5 g/dL    RDW 12.4 10.0 - 15.0 %    Platelet Count 314 150 - 450 10e3/uL   UA Microscopic with Reflex to Culture     Status: Abnormal   Result Value Ref Range    Bacteria Urine Moderate (A) None Seen /HPF    RBC Urine 10-25 (A) 0-2 /HPF /HPF    WBC Urine 0-5 0-5 /HPF /HPF    Squamous Epithelials Urine Few (A) None Seen /LPF    Narrative    Urine Culture not indicated

## 2023-07-26 DIAGNOSIS — N20.0 KIDNEY STONE: Primary | ICD-10-CM

## 2023-09-15 ENCOUNTER — OFFICE VISIT (OUTPATIENT)
Dept: UROLOGY | Facility: CLINIC | Age: 63
End: 2023-09-15
Attending: FAMILY MEDICINE
Payer: COMMERCIAL

## 2023-09-15 VITALS
HEART RATE: 69 BPM | DIASTOLIC BLOOD PRESSURE: 68 MMHG | SYSTOLIC BLOOD PRESSURE: 124 MMHG | HEIGHT: 68 IN | BODY MASS INDEX: 24.55 KG/M2 | WEIGHT: 162 LBS

## 2023-09-15 DIAGNOSIS — R82.4 KETONURIA: ICD-10-CM

## 2023-09-15 DIAGNOSIS — N20.0 KIDNEY STONE: ICD-10-CM

## 2023-09-15 DIAGNOSIS — R31.9 HEMATURIA, UNSPECIFIED TYPE: Primary | ICD-10-CM

## 2023-09-15 LAB
ALBUMIN UR-MCNC: NEGATIVE MG/DL
APPEARANCE UR: CLEAR
BILIRUB UR QL STRIP: NEGATIVE
COLOR UR AUTO: YELLOW
GLUCOSE UR STRIP-MCNC: NEGATIVE MG/DL
HGB UR QL STRIP: ABNORMAL
KETONES UR STRIP-MCNC: ABNORMAL MG/DL
LEUKOCYTE ESTERASE UR QL STRIP: NEGATIVE
NITRATE UR QL: NEGATIVE
PH UR STRIP: 5.5 [PH] (ref 5–7)
SP GR UR STRIP: >=1.03 (ref 1–1.03)
UROBILINOGEN UR STRIP-ACNC: 0.2 E.U./DL

## 2023-09-15 PROCEDURE — 81003 URINALYSIS AUTO W/O SCOPE: CPT | Mod: QW | Performed by: STUDENT IN AN ORGANIZED HEALTH CARE EDUCATION/TRAINING PROGRAM

## 2023-09-15 PROCEDURE — 99204 OFFICE O/P NEW MOD 45 MIN: CPT | Performed by: STUDENT IN AN ORGANIZED HEALTH CARE EDUCATION/TRAINING PROGRAM

## 2023-09-15 RX ORDER — CEFAZOLIN SODIUM 2 G/50ML
2 SOLUTION INTRAVENOUS
Status: CANCELLED | OUTPATIENT
Start: 2023-09-15

## 2023-09-15 RX ORDER — CEFAZOLIN SODIUM 2 G/50ML
2 SOLUTION INTRAVENOUS SEE ADMIN INSTRUCTIONS
Status: CANCELLED | OUTPATIENT
Start: 2023-09-15

## 2023-09-15 ASSESSMENT — PAIN SCALES - GENERAL: PAINLEVEL: NO PAIN (0)

## 2023-09-15 NOTE — PROGRESS NOTES
[facility]  Main Office[facility] [address] [facility]  [address]  [place] [address]       CHIEF COMPLAINT:  nephrolithiasis    HISTORY:   64 yo M with h/o spontaneously passed nephrolithiasis, presenting with bilateral kidney stones L>R    In late July 2023 he had low back pain L>R. CT showed nonobstructive nephrolithiasis L>R. Here for consultation    Denies any further pain at this time.     ROS significant for urinary frequency. Does not bother him.    Not a       PAST MEDICAL HISTORY:   Past Medical History:   Diagnosis Date    Shingles        PAST SURGICAL HISTORY:   Past Surgical History:   Procedure Laterality Date    deviated septum         FAMILY HISTORY:   Family History   Problem Relation Age of Onset    Leukemia Mother     Hypertension Father        SOCIAL HISTORY:   Social History     Tobacco Use    Smoking status: Never    Smokeless tobacco: Never   Substance Use Topics    Alcohol use: Yes     Comment: Once a week        No Known Allergies      Current Outpatient Medications:     cyclobenzaprine (FLEXERIL) 5 MG tablet, Take 1-2 tablets (5-10 mg) by mouth 3 times daily as needed for muscle spasms, Disp: 30 tablet, Rfl: 0    Lansoprazole (PREVACID PO), Take 1 tablet by mouth daily as needed, Disp: , Rfl:     Review Of Systems:  Skin: No rash, pruritis, or skin pigmentation  Eyes: No changes in vision  Ears/Nose/Throat: No changes in hearing, no nosebleeds  Respiratory: No shortness of breath, dyspnea on exertion, cough, or hemoptysis  Cardiovascular: No chest pain or palpitations  Gastrointestinal: No diarrhea or constipation. No abdominal pain. No hematochezia  Genitourinary: see HPI  Musculoskeletal: No pain or swelling of joints, normal range of motion  Neurologic: No weakness or tremors  Psychiatric: No recent changes in memory or mood  Hematologic/Lymphatic/Immunologic: No easy bruising or enlarged lymph nodes  Endocrine: No weight gain or loss      PHYSICAL EXAM:    /68   Pulse 69   Ht  "1.727 m (5' 8\")   Wt 73.5 kg (162 lb)   BMI 24.63 kg/m    General appearance: In NAD, conversant  HEENT: Normocephalic and atraumatic, anicteric sclera  Cardiovascular: Not examined  Respiratory: normal, non-labored breathing  Gastrointestinal: negative, Abdomen soft, non-tender, and non-distended.   Musculoskeletal: Not Examined  Peripheral Vascular/extremity: No peripheral edema  Skin: Normal temperature, turgor, and texture. No rash  Psychiatric: Appropriate affect, alert and oriented to person, place, and time    Cystoscopy: Not done    UA RESULTS:  Recent Labs   Lab Test 09/15/23  1458 07/25/23  1427   COLOR Yellow Yellow   APPEARANCE Clear Clear   URINEGLC Negative Negative   URINEBILI Negative Negative   URINEKETONE Trace* Negative   SG >=1.030 1.020   UBLD Moderate* Moderate*   URINEPH 5.5 6.0   PROTEIN Negative 30*   UROBILINOGEN 0.2 0.2   NITRITE Negative Negative   LEUKEST Negative Negative   RBCU  --  10-25*   WBCU  --  0-5       Bladder Scan:     Other Labs:      Imaging Studies:    Ct a/p 7/25/2023      Reviewed and interpreted personally. 15 mm left upper pole stone. High density >1400 H U. 6.2 cm STSD      CLINICAL IMPRESSION:   62 yo M with h/o spontaneously passed nephrolithiasis, presenting with bilateral kidney stones L>R  Dominant 1.5 cm left upper pole stone, with smaller calyceal tip stones on the right up to 4 mm     Discussed treatment options including ESWL +/- stent, ureteroscopy with laser lithotripsy and stone basketing, percutaneous nephrolithotomy. After extensive discussion of risks and benefits patient agrees to bilateral ESWL with left stent placement due to stone burden with possible need for follow up left ureteroscopy    Risks of ESWL including bleeding, pain, incomplete stone clearance, Steinstrasse, need for secondary procedure discussed    I discussed ureteroscopic management with laser lithotripsy and stone basketing with stent placement. Risks including need for secondary " procedure, infection, ureteral injury, incomplete stone clearance, stent pain and irritation were discussed    We discussed empiric stone prevention diet. Note that patient likes to eat spinach.    Trace blood on UA probably from nephrolithiasis; the bladder will be assessed by cystoscopy at time of stone surgery    Ketonuria: nonspecific, usually related to dehydration      PLAN:   bilateral extracorporeal shockwave lithotripsy, cystoscopy with left retrograde pyelogram and left ureteral stent placement    Omari Melvin MD   [facility]  [phone] clinic phone

## 2023-09-15 NOTE — LETTER
9/15/2023       RE: Ryder Zepeda  [address] [facility] [address]     Dear Colleague,    Thank you for referring your patient, Ryder Zepeda, to the [facility] [place] at [facility]. Please see a copy of my visit note below.    [facility]  Main Office: [address]  [address]  [place] [address]       CHIEF COMPLAINT:  nephrolithiasis    HISTORY:   64 yo M with h/o spontaneously passed nephrolithiasis, presenting with bilateral kidney stones L>R    In late July 2023 he had low back pain L>R. CT showed nonobstructive nephrolithiasis L>R. Here for consultation    Denies any further pain at this time.     ROS significant for urinary frequency. Does not bother him.    Not a       PAST MEDICAL HISTORY:   Past Medical History:   Diagnosis Date    Shingles        PAST SURGICAL HISTORY:   Past Surgical History:   Procedure Laterality Date    deviated septum         FAMILY HISTORY:   Family History   Problem Relation Age of Onset    Leukemia Mother     Hypertension Father        SOCIAL HISTORY:   Social History     Tobacco Use    Smoking status: Never    Smokeless tobacco: Never   Substance Use Topics    Alcohol use: Yes     Comment: Once a week        No Known Allergies      Current Outpatient Medications:     cyclobenzaprine (FLEXERIL) 5 MG tablet, Take 1-2 tablets (5-10 mg) by mouth 3 times daily as needed for muscle spasms, Disp: 30 tablet, Rfl: 0    Lansoprazole (PREVACID PO), Take 1 tablet by mouth daily as needed, Disp: , Rfl:     Review Of Systems:  Skin: No rash, pruritis, or skin pigmentation  Eyes: No changes in vision  Ears/Nose/Throat: No changes in hearing, no nosebleeds  Respiratory: No shortness of breath, dyspnea on exertion, cough, or hemoptysis  Cardiovascular: No chest pain or palpitations  Gastrointestinal: No diarrhea or constipation. No abdominal pain. No hematochezia  Genitourinary: see  "HPI  Musculoskeletal: No pain or swelling of joints, normal range of motion  Neurologic: No weakness or tremors  Psychiatric: No recent changes in memory or mood  Hematologic/Lymphatic/Immunologic: No easy bruising or enlarged lymph nodes  Endocrine: No weight gain or loss      PHYSICAL EXAM:    /68   Pulse 69   Ht 1.727 m (5' 8\")   Wt 73.5 kg (162 lb)   BMI 24.63 kg/m    General appearance: In NAD, conversant  HEENT: Normocephalic and atraumatic, anicteric sclera  Cardiovascular: Not examined  Respiratory: normal, non-labored breathing  Gastrointestinal: negative, Abdomen soft, non-tender, and non-distended.   Musculoskeletal: Not Examined  Peripheral Vascular/extremity: No peripheral edema  Skin: Normal temperature, turgor, and texture. No rash  Psychiatric: Appropriate affect, alert and oriented to person, place, and time    Cystoscopy: Not done    UA RESULTS:  Recent Labs   Lab Test 09/15/23  1458 07/25/23  1427   COLOR Yellow Yellow   APPEARANCE Clear Clear   URINEGLC Negative Negative   URINEBILI Negative Negative   URINEKETONE Trace* Negative   SG >=1.030 1.020   UBLD Moderate* Moderate*   URINEPH 5.5 6.0   PROTEIN Negative 30*   UROBILINOGEN 0.2 0.2   NITRITE Negative Negative   LEUKEST Negative Negative   RBCU  --  10-25*   WBCU  --  0-5       Bladder Scan:     Other Labs:      Imaging Studies:    Ct a/p 7/25/2023      Reviewed and interpreted personally. 15 mm left upper pole stone. High density >1400 H U. 6.2 cm STSD      CLINICAL IMPRESSION:   62 yo M with h/o spontaneously passed nephrolithiasis, presenting with bilateral kidney stones L>R  Dominant 1.5 cm left upper pole stone, with smaller calyceal tip stones on the right up to 4 mm     Discussed treatment options including ESWL +/- stent, ureteroscopy with laser lithotripsy and stone basketing, percutaneous nephrolithotomy. After extensive discussion of risks and benefits patient agrees to bilateral ESWL with left stent placement due to " stone burden with possible need for follow up left ureteroscopy    Risks of ESWL including bleeding, pain, incomplete stone clearance, Steinstrasse, need for secondary procedure discussed    I discussed ureteroscopic management with laser lithotripsy and stone basketing with stent placement. Risks including need for secondary procedure, infection, ureteral injury, incomplete stone clearance, stent pain and irritation were discussed    We discussed empiric stone prevention diet. Note that patient likes to eat spinach.    Trace blood on UA probably from nephrolithiasis; the bladder will be assessed by cystoscopy at time of stone surgery    Ketonuria: nonspecific, usually related to dehydration      PLAN:   bilateral extracorporeal shockwave lithotripsy, cystoscopy with left retrograde pyelogram and left ureteral stent placement    Omari Melvin MD   [facility]  [phone] clinic phone

## 2023-09-17 ENCOUNTER — HEALTH MAINTENANCE LETTER (OUTPATIENT)
Age: 63
End: 2023-09-17

## 2023-10-17 ENCOUNTER — OFFICE VISIT (OUTPATIENT)
Dept: FAMILY MEDICINE | Facility: CLINIC | Age: 63
End: 2023-10-17
Payer: COMMERCIAL

## 2023-10-17 VITALS
SYSTOLIC BLOOD PRESSURE: 130 MMHG | TEMPERATURE: 96.4 F | DIASTOLIC BLOOD PRESSURE: 70 MMHG | OXYGEN SATURATION: 98 % | HEART RATE: 67 BPM | BODY MASS INDEX: 24.23 KG/M2 | HEIGHT: 68 IN | RESPIRATION RATE: 16 BRPM | WEIGHT: 159.9 LBS

## 2023-10-17 DIAGNOSIS — Z13.220 SCREENING FOR HYPERLIPIDEMIA: ICD-10-CM

## 2023-10-17 DIAGNOSIS — Z01.818 PREOPERATIVE EXAMINATION: Primary | ICD-10-CM

## 2023-10-17 DIAGNOSIS — N20.0 KIDNEY STONE: ICD-10-CM

## 2023-10-17 DIAGNOSIS — Z12.5 SCREENING FOR PROSTATE CANCER: ICD-10-CM

## 2023-10-17 DIAGNOSIS — Z13.1 SCREENING FOR DIABETES MELLITUS: ICD-10-CM

## 2023-10-17 LAB
ALBUMIN SERPL BCG-MCNC: 4.7 G/DL (ref 3.5–5.2)
ALP SERPL-CCNC: 89 U/L (ref 40–129)
ALT SERPL W P-5'-P-CCNC: 25 U/L (ref 0–70)
ANION GAP SERPL CALCULATED.3IONS-SCNC: 12 MMOL/L (ref 7–15)
AST SERPL W P-5'-P-CCNC: 25 U/L (ref 0–45)
BILIRUB SERPL-MCNC: 0.4 MG/DL
BUN SERPL-MCNC: 12 MG/DL (ref 8–23)
CALCIUM SERPL-MCNC: 9.7 MG/DL (ref 8.8–10.2)
CHLORIDE SERPL-SCNC: 101 MMOL/L (ref 98–107)
CHOLEST SERPL-MCNC: 276 MG/DL
CREAT SERPL-MCNC: 0.91 MG/DL (ref 0.67–1.17)
DEPRECATED HCO3 PLAS-SCNC: 27 MMOL/L (ref 22–29)
EGFRCR SERPLBLD CKD-EPI 2021: >90 ML/MIN/1.73M2
GLUCOSE SERPL-MCNC: 94 MG/DL (ref 70–99)
HDLC SERPL-MCNC: 60 MG/DL
LDLC SERPL CALC-MCNC: 184 MG/DL
NONHDLC SERPL-MCNC: 216 MG/DL
POTASSIUM SERPL-SCNC: 4.6 MMOL/L (ref 3.4–5.3)
PROT SERPL-MCNC: 7.9 G/DL (ref 6.4–8.3)
PSA SERPL DL<=0.01 NG/ML-MCNC: 1.18 NG/ML (ref 0–4.5)
SODIUM SERPL-SCNC: 140 MMOL/L (ref 135–145)
TRIGL SERPL-MCNC: 160 MG/DL

## 2023-10-17 PROCEDURE — G0103 PSA SCREENING: HCPCS | Performed by: FAMILY MEDICINE

## 2023-10-17 PROCEDURE — 80053 COMPREHEN METABOLIC PANEL: CPT | Performed by: FAMILY MEDICINE

## 2023-10-17 PROCEDURE — 80061 LIPID PANEL: CPT | Performed by: FAMILY MEDICINE

## 2023-10-17 PROCEDURE — 99214 OFFICE O/P EST MOD 30 MIN: CPT | Performed by: FAMILY MEDICINE

## 2023-10-17 PROCEDURE — 36415 COLL VENOUS BLD VENIPUNCTURE: CPT | Performed by: FAMILY MEDICINE

## 2023-10-17 NOTE — PROGRESS NOTES
[address] [address]  Phone: [phone]  Primary Provider: Faheem Penn  Pre-op Performing Provider: FAHEEM PENN      PREOPERATIVE EVALUATION:  Today's date: 10/17/2023    Ryder is a 63 year old male who presents for a preoperative evaluation.      10/17/2023    11:05 AM   Additional Questions   Roomed by Bel CMA   Accompanied by Self       Surgical Information:  Surgery/Procedure: Bilateral extracorporeal shockwave lithotripsy Cystoscopy with left retrograde pyelogram and left ureteral stent placement   Surgery Location: [facility]  Surgeon: Omari Melvin MD   Surgery Date: 10/26/2023  Time of Surgery: 11:50 AM  Where patient plans to recover: At home with family  Fax number for surgical facility: Note does not need to be faxed, will be available electronically in Epic.    Assessment & Plan     The proposed surgical procedure is considered INTERMEDIATE risk.    Preoperative examination    Kidney stone    Screening for prostate cancer  - PSA, screen    Screening for diabetes mellitus  - Comprehensive metabolic panel (BMP + Alb, Alk Phos, ALT, AST, Total. Bili, TP)    Screening for hyperlipidemia  - Lipid panel reflex to direct LDL Fasting            - No identified additional risk factors other than previously addressed    Antiplatelet or Anticoagulation Medication Instructions:   - Patient is on no antiplatelet or anticoagulation medications.    Additional Medication Instructions:  Patient is on no additional chronic medications    RECOMMENDATION:  APPROVAL GIVEN to proceed with proposed procedure, without further diagnostic evaluation.      Subjective       HPI related to upcoming procedure: history of kidney stones          10/17/2023    11:07 AM   Preop Questions   1. Have you ever had a heart attack or stroke? No   2. Have you ever had surgery on your heart or blood vessels, such as a stent placement, a  coronary artery bypass, or surgery on an artery in your head, neck, heart, or legs? No   3. Do you have chest pain with activity? No   4. Do you have a history of  heart failure? No   5. Do you currently have a cold, bronchitis or symptoms of other infection? No   6. Do you have a cough, shortness of breath, or wheezing? No   7. Do you or anyone in your family have previous history of blood clots? No   8. Do you or does anyone in your family have a serious bleeding problem such as prolonged bleeding following surgeries or cuts? No   9. Have you ever had problems with anemia or been told to take iron pills? No   10. Have you had any abnormal blood loss such as black, tarry or bloody stools? No   11. Have you ever had a blood transfusion? No   12. Are you willing to have a blood transfusion if it is medically needed before, during, or after your surgery? Yes   13. Have you or any of your relatives ever had problems with anesthesia? No   14. Do you have sleep apnea, excessive snoring or daytime drowsiness? No   15. Do you have any artifical heart valves or other implanted medical devices like a pacemaker, defibrillator, or continuous glucose monitor? No   16. Do you have artificial joints? No   17. Are you allergic to latex? No     Health Care Directive:  Patient does not have a Health Care Directive or Living Will: Discussed advance care planning with patient; however, patient declined at this time.    Preoperative Review of :   reviewed - no record of controlled substances prescribed.      Status of Chronic Conditions:  See problem list for active medical problems.  Problems all longstanding and stable, except as noted/documented.  See ROS for pertinent symptoms related to these conditions.    Review of Systems  CONSTITUTIONAL: NEGATIVE for fever, chills, change in weight  ENT/MOUTH: NEGATIVE for ear, mouth and throat problems  RESP: NEGATIVE for significant cough or SOB  CV: NEGATIVE for chest pain,  "palpitations or peripheral edema    Patient Active Problem List    Diagnosis Date Noted    Hyperlipidemia 01/26/2023     Priority: Medium     Formatting of this note might be different from the original.  FLP 3/7/07: , , LDL 48,       Shingles      Priority: Medium    History of colonic polyps 05/26/2017     Priority: Medium    Diverticular disease of large intestine 05/26/2017     Priority: Medium      Past Medical History:   Diagnosis Date    Shingles      Past Surgical History:   Procedure Laterality Date    deviated septum       Current Outpatient Medications   Medication Sig Dispense Refill    cyclobenzaprine (FLEXERIL) 5 MG tablet Take 1-2 tablets (5-10 mg) by mouth 3 times daily as needed for muscle spasms 30 tablet 0    Lansoprazole (PREVACID PO) Take 1 tablet by mouth daily as needed         No Known Allergies     Social History     Tobacco Use    Smoking status: Never    Smokeless tobacco: Never   Substance Use Topics    Alcohol use: Yes     Comment: Once a week     Family History   Problem Relation Age of Onset    Leukemia Mother     Hypertension Father      History   Drug Use No         Objective     /70   Pulse 67   Temp (!) 96.4  F (35.8  C) (Tympanic)   Resp 16   Ht 1.727 m (5' 8\")   Wt 72.5 kg (159 lb 14.4 oz)   SpO2 98%   BMI 24.31 kg/m      Physical Exam  GENERAL APPEARANCE: healthy, alert and no distress  HENT: ear canals and TM's normal and nose and mouth without ulcers or lesions  RESP: lungs clear to auscultation - no rales, rhonchi or wheezes  CV: regular rate and rhythm, normal S1 S2, no S3 or S4 and no murmur, click or rub   ABDOMEN: soft, nontender, no HSM or masses and bowel sounds normal  NEURO: Normal strength and tone, sensory exam grossly normal, mentation intact and speech normal    Recent Labs   Lab Test 07/25/23  1509   HGB 13.3           Diagnostics:  Recent Results (from the past 168 hour(s))   PSA, screen    Collection Time: 10/17/23 12:16 " PM   Result Value Ref Range    Prostate Specific Antigen Screen 1.18 0.00 - 4.50 ng/mL   Comprehensive metabolic panel (BMP + Alb, Alk Phos, ALT, AST, Total. Bili, TP)    Collection Time: 10/17/23 12:16 PM   Result Value Ref Range    Sodium 140 135 - 145 mmol/L    Potassium 4.6 3.4 - 5.3 mmol/L    Carbon Dioxide (CO2) 27 22 - 29 mmol/L    Anion Gap 12 7 - 15 mmol/L    Urea Nitrogen 12.0 8.0 - 23.0 mg/dL    Creatinine 0.91 0.67 - 1.17 mg/dL    GFR Estimate >90 >60 mL/min/1.73m2    Calcium 9.7 8.8 - 10.2 mg/dL    Chloride 101 98 - 107 mmol/L    Glucose 94 70 - 99 mg/dL    Alkaline Phosphatase 89 40 - 129 U/L    AST 25 0 - 45 U/L    ALT 25 0 - 70 U/L    Protein Total 7.9 6.4 - 8.3 g/dL    Albumin 4.7 3.5 - 5.2 g/dL    Bilirubin Total 0.4 <=1.2 mg/dL   Lipid panel reflex to direct LDL Fasting    Collection Time: 10/17/23 12:16 PM   Result Value Ref Range    Cholesterol 276 (H) <200 mg/dL    Triglycerides 160 (H) <150 mg/dL    Direct Measure HDL 60 >=40 mg/dL    LDL Cholesterol Calculated 184 (H) <=100 mg/dL    Non HDL Cholesterol 216 (H) <130 mg/dL      No EKG required, no history of coronary heart disease, significant arrhythmia, peripheral arterial disease or other structural heart disease.    Revised Cardiac Risk Index (RCRI):  The patient has the following serious cardiovascular risks for perioperative complications:   - No serious cardiac risks = 0 points     RCRI Interpretation: 0 points: Class I (very low risk - 0.4% complication rate)         Signed Electronically by: Faheem Penn DO  Copy of this evaluation report is provided to requesting physician.

## 2023-10-26 ENCOUNTER — HOSPITAL ENCOUNTER (OUTPATIENT)
Facility: CLINIC | Age: 63
Discharge: HOME | End: 2023-10-26
Attending: STUDENT IN AN ORGANIZED HEALTH CARE EDUCATION/TRAINING PROGRAM | Admitting: STUDENT IN AN ORGANIZED HEALTH CARE EDUCATION/TRAINING PROGRAM
Payer: COMMERCIAL

## 2023-10-26 ENCOUNTER — ANESTHESIA EVENT (OUTPATIENT)
Dept: SURGERY | Facility: CLINIC | Age: 63
End: 2023-10-26
Payer: COMMERCIAL

## 2023-10-26 ENCOUNTER — ANESTHESIA (OUTPATIENT)
Dept: SURGERY | Facility: CLINIC | Age: 63
End: 2023-10-26
Payer: COMMERCIAL

## 2023-10-26 VITALS
SYSTOLIC BLOOD PRESSURE: 153 MMHG | OXYGEN SATURATION: 99 % | RESPIRATION RATE: 12 BRPM | TEMPERATURE: 96.8 F | BODY MASS INDEX: 24.08 KG/M2 | DIASTOLIC BLOOD PRESSURE: 96 MMHG | HEART RATE: 53 BPM | WEIGHT: 158.4 LBS

## 2023-10-26 DIAGNOSIS — N20.0 BILATERAL NEPHROLITHIASIS: Primary | ICD-10-CM

## 2023-10-26 PROCEDURE — 250N000011 HC RX IP 250 OP 636: Performed by: STUDENT IN AN ORGANIZED HEALTH CARE EDUCATION/TRAINING PROGRAM

## 2023-10-26 PROCEDURE — 52332 CYSTOSCOPY AND TREATMENT: CPT | Mod: LT | Performed by: STUDENT IN AN ORGANIZED HEALTH CARE EDUCATION/TRAINING PROGRAM

## 2023-10-26 PROCEDURE — 370N000017 HC ANESTHESIA TECHNICAL FEE, PER MIN: Performed by: STUDENT IN AN ORGANIZED HEALTH CARE EDUCATION/TRAINING PROGRAM

## 2023-10-26 PROCEDURE — 272N000001 HC OR GENERAL SUPPLY STERILE: Performed by: STUDENT IN AN ORGANIZED HEALTH CARE EDUCATION/TRAINING PROGRAM

## 2023-10-26 PROCEDURE — 50590 FRAGMENTING OF KIDNEY STONE: CPT | Mod: 50 | Performed by: STUDENT IN AN ORGANIZED HEALTH CARE EDUCATION/TRAINING PROGRAM

## 2023-10-26 PROCEDURE — 255N000002 HC RX 255 OP 636: Mod: JZ | Performed by: STUDENT IN AN ORGANIZED HEALTH CARE EDUCATION/TRAINING PROGRAM

## 2023-10-26 PROCEDURE — 999N000141 HC STATISTIC PRE-PROCEDURE NURSING ASSESSMENT: Performed by: STUDENT IN AN ORGANIZED HEALTH CARE EDUCATION/TRAINING PROGRAM

## 2023-10-26 PROCEDURE — C1758 CATHETER, URETERAL: HCPCS | Performed by: STUDENT IN AN ORGANIZED HEALTH CARE EDUCATION/TRAINING PROGRAM

## 2023-10-26 PROCEDURE — 250N000011 HC RX IP 250 OP 636: Performed by: NURSE ANESTHETIST, CERTIFIED REGISTERED

## 2023-10-26 PROCEDURE — 360N000084 HC SURGERY LEVEL 4 W/ FLUORO, PER MIN: Performed by: STUDENT IN AN ORGANIZED HEALTH CARE EDUCATION/TRAINING PROGRAM

## 2023-10-26 PROCEDURE — 74420 UROGRAPHY RTRGR +-KUB: CPT | Mod: 26 | Performed by: STUDENT IN AN ORGANIZED HEALTH CARE EDUCATION/TRAINING PROGRAM

## 2023-10-26 PROCEDURE — 258N000003 HC RX IP 258 OP 636: Performed by: ANESTHESIOLOGY

## 2023-10-26 PROCEDURE — C1769 GUIDE WIRE: HCPCS | Performed by: STUDENT IN AN ORGANIZED HEALTH CARE EDUCATION/TRAINING PROGRAM

## 2023-10-26 PROCEDURE — 250N000009 HC RX 250: Performed by: STUDENT IN AN ORGANIZED HEALTH CARE EDUCATION/TRAINING PROGRAM

## 2023-10-26 PROCEDURE — 250N000009 HC RX 250: Performed by: NURSE ANESTHETIST, CERTIFIED REGISTERED

## 2023-10-26 PROCEDURE — C2617 STENT, NON-COR, TEM W/O DEL: HCPCS | Performed by: STUDENT IN AN ORGANIZED HEALTH CARE EDUCATION/TRAINING PROGRAM

## 2023-10-26 PROCEDURE — 258N000003 HC RX IP 258 OP 636: Performed by: NURSE ANESTHETIST, CERTIFIED REGISTERED

## 2023-10-26 PROCEDURE — 710N000009 HC RECOVERY PHASE 1, LEVEL 1, PER MIN: Performed by: STUDENT IN AN ORGANIZED HEALTH CARE EDUCATION/TRAINING PROGRAM

## 2023-10-26 PROCEDURE — 258N000001 HC RX 258: Performed by: STUDENT IN AN ORGANIZED HEALTH CARE EDUCATION/TRAINING PROGRAM

## 2023-10-26 PROCEDURE — 710N000012 HC RECOVERY PHASE 2, PER MINUTE: Performed by: STUDENT IN AN ORGANIZED HEALTH CARE EDUCATION/TRAINING PROGRAM

## 2023-10-26 PROCEDURE — 250N000025 HC SEVOFLURANE, PER MIN: Performed by: STUDENT IN AN ORGANIZED HEALTH CARE EDUCATION/TRAINING PROGRAM

## 2023-10-26 DEVICE — URETERAL STENT
Type: IMPLANTABLE DEVICE | Site: URETER | Status: FUNCTIONAL
Brand: POLARIS™ ULTRA

## 2023-10-26 RX ORDER — FUROSEMIDE 10 MG/ML
INJECTION INTRAMUSCULAR; INTRAVENOUS PRN
Status: DISCONTINUED | OUTPATIENT
Start: 2023-10-26 | End: 2023-10-26

## 2023-10-26 RX ORDER — MULTIVITAMIN,THERAPEUTIC
1 TABLET ORAL DAILY
COMMUNITY

## 2023-10-26 RX ORDER — LIDOCAINE 40 MG/G
CREAM TOPICAL
Status: DISCONTINUED | OUTPATIENT
Start: 2023-10-26 | End: 2023-10-26 | Stop reason: HOSPADM

## 2023-10-26 RX ORDER — FENTANYL CITRATE-0.9 % NACL/PF 10 MCG/ML
PLASTIC BAG, INJECTION (ML) INTRAVENOUS CONTINUOUS PRN
Status: DISCONTINUED | OUTPATIENT
Start: 2023-10-26 | End: 2023-10-26

## 2023-10-26 RX ORDER — FENTANYL CITRATE 50 UG/ML
INJECTION, SOLUTION INTRAMUSCULAR; INTRAVENOUS PRN
Status: DISCONTINUED | OUTPATIENT
Start: 2023-10-26 | End: 2023-10-26

## 2023-10-26 RX ORDER — TAMSULOSIN HYDROCHLORIDE 0.4 MG/1
0.4 CAPSULE ORAL DAILY
Qty: 14 CAPSULE | Refills: 0 | Status: SHIPPED | OUTPATIENT
Start: 2023-10-26

## 2023-10-26 RX ORDER — KETOROLAC TROMETHAMINE 30 MG/ML
INJECTION, SOLUTION INTRAMUSCULAR; INTRAVENOUS PRN
Status: DISCONTINUED | OUTPATIENT
Start: 2023-10-26 | End: 2023-10-26

## 2023-10-26 RX ORDER — SODIUM CHLORIDE, SODIUM LACTATE, POTASSIUM CHLORIDE, CALCIUM CHLORIDE 600; 310; 30; 20 MG/100ML; MG/100ML; MG/100ML; MG/100ML
INJECTION, SOLUTION INTRAVENOUS CONTINUOUS
Status: DISCONTINUED | OUTPATIENT
Start: 2023-10-26 | End: 2023-10-26 | Stop reason: HOSPADM

## 2023-10-26 RX ORDER — GLYCOPYRROLATE 0.2 MG/ML
INJECTION, SOLUTION INTRAMUSCULAR; INTRAVENOUS PRN
Status: DISCONTINUED | OUTPATIENT
Start: 2023-10-26 | End: 2023-10-26

## 2023-10-26 RX ORDER — PROPOFOL 10 MG/ML
INJECTION, EMULSION INTRAVENOUS PRN
Status: DISCONTINUED | OUTPATIENT
Start: 2023-10-26 | End: 2023-10-26

## 2023-10-26 RX ORDER — ONDANSETRON 2 MG/ML
INJECTION INTRAMUSCULAR; INTRAVENOUS PRN
Status: DISCONTINUED | OUTPATIENT
Start: 2023-10-26 | End: 2023-10-26

## 2023-10-26 RX ORDER — PSEUDOEPHEDRINE HCL 30 MG
TABLET ORAL EVERY 4 HOURS PRN
COMMUNITY

## 2023-10-26 RX ORDER — CIPROFLOXACIN 500 MG/1
500 TABLET, FILM COATED ORAL ONCE
Qty: 1 TABLET | Refills: 0 | Status: SHIPPED | OUTPATIENT
Start: 2023-10-26 | End: 2023-10-26

## 2023-10-26 RX ORDER — CEFAZOLIN SODIUM/WATER 2 G/20 ML
2 SYRINGE (ML) INTRAVENOUS SEE ADMIN INSTRUCTIONS
Status: DISCONTINUED | OUTPATIENT
Start: 2023-10-26 | End: 2023-10-26 | Stop reason: HOSPADM

## 2023-10-26 RX ORDER — DOCUSATE SODIUM 100 MG/1
100 CAPSULE, LIQUID FILLED ORAL 2 TIMES DAILY
Qty: 30 CAPSULE | Refills: 0 | Status: SHIPPED | OUTPATIENT
Start: 2023-10-26

## 2023-10-26 RX ORDER — LIDOCAINE HYDROCHLORIDE 20 MG/ML
INJECTION, SOLUTION INFILTRATION; PERINEURAL PRN
Status: DISCONTINUED | OUTPATIENT
Start: 2023-10-26 | End: 2023-10-26

## 2023-10-26 RX ORDER — DEXAMETHASONE SODIUM PHOSPHATE 4 MG/ML
INJECTION, SOLUTION INTRA-ARTICULAR; INTRALESIONAL; INTRAMUSCULAR; INTRAVENOUS; SOFT TISSUE PRN
Status: DISCONTINUED | OUTPATIENT
Start: 2023-10-26 | End: 2023-10-26

## 2023-10-26 RX ORDER — CEFAZOLIN SODIUM/WATER 2 G/20 ML
2 SYRINGE (ML) INTRAVENOUS
Status: COMPLETED | OUTPATIENT
Start: 2023-10-26 | End: 2023-10-26

## 2023-10-26 RX ORDER — ACETAMINOPHEN 500 MG
1000 TABLET ORAL EVERY 6 HOURS PRN
Qty: 100 TABLET | Refills: 0 | Status: SHIPPED | OUTPATIENT
Start: 2023-10-26

## 2023-10-26 RX ORDER — OXYCODONE HYDROCHLORIDE 5 MG/1
5 TABLET ORAL EVERY 6 HOURS PRN
Qty: 8 TABLET | Refills: 0 | Status: SHIPPED | OUTPATIENT
Start: 2023-10-26 | End: 2023-10-29

## 2023-10-26 RX ORDER — PROPOFOL 10 MG/ML
INJECTION, EMULSION INTRAVENOUS CONTINUOUS PRN
Status: DISCONTINUED | OUTPATIENT
Start: 2023-10-26 | End: 2023-10-26

## 2023-10-26 RX ORDER — ACETAMINOPHEN 500 MG
500-1000 TABLET ORAL EVERY 6 HOURS PRN
Status: ON HOLD | COMMUNITY
End: 2023-10-26

## 2023-10-26 RX ADMIN — PHENYLEPHRINE HYDROCHLORIDE 100 MCG: 10 INJECTION INTRAVENOUS at 12:29

## 2023-10-26 RX ADMIN — PHENYLEPHRINE HYDROCHLORIDE 400 MCG: 10 INJECTION INTRAVENOUS at 12:46

## 2023-10-26 RX ADMIN — MIDAZOLAM 2 MG: 1 INJECTION INTRAMUSCULAR; INTRAVENOUS at 12:09

## 2023-10-26 RX ADMIN — PHENYLEPHRINE HYDROCHLORIDE 200 MCG: 10 INJECTION INTRAVENOUS at 12:55

## 2023-10-26 RX ADMIN — PHENYLEPHRINE HYDROCHLORIDE 100 MCG: 10 INJECTION INTRAVENOUS at 12:32

## 2023-10-26 RX ADMIN — PROPOFOL 75 MCG/KG/MIN: 10 INJECTION, EMULSION INTRAVENOUS at 12:17

## 2023-10-26 RX ADMIN — PHENYLEPHRINE HYDROCHLORIDE 200 MCG: 10 INJECTION INTRAVENOUS at 12:34

## 2023-10-26 RX ADMIN — GLYCOPYRROLATE 0.2 MG: 0.2 INJECTION, SOLUTION INTRAMUSCULAR; INTRAVENOUS at 12:48

## 2023-10-26 RX ADMIN — PHENYLEPHRINE HYDROCHLORIDE 200 MCG: 10 INJECTION INTRAVENOUS at 12:43

## 2023-10-26 RX ADMIN — SODIUM CHLORIDE, POTASSIUM CHLORIDE, SODIUM LACTATE AND CALCIUM CHLORIDE: 600; 310; 30; 20 INJECTION, SOLUTION INTRAVENOUS at 12:09

## 2023-10-26 RX ADMIN — GLYCOPYRROLATE 0.2 MG: 0.2 INJECTION, SOLUTION INTRAMUSCULAR; INTRAVENOUS at 12:34

## 2023-10-26 RX ADMIN — PROPOFOL 200 MG: 10 INJECTION, EMULSION INTRAVENOUS at 12:17

## 2023-10-26 RX ADMIN — PHENYLEPHRINE HYDROCHLORIDE 200 MCG: 10 INJECTION INTRAVENOUS at 12:37

## 2023-10-26 RX ADMIN — Medication 2 G: at 12:09

## 2023-10-26 RX ADMIN — ONDANSETRON 4 MG: 2 INJECTION INTRAMUSCULAR; INTRAVENOUS at 12:17

## 2023-10-26 RX ADMIN — PHENYLEPHRINE HYDROCHLORIDE 200 MCG: 10 INJECTION INTRAVENOUS at 12:52

## 2023-10-26 RX ADMIN — SODIUM CHLORIDE, POTASSIUM CHLORIDE, SODIUM LACTATE AND CALCIUM CHLORIDE: 600; 310; 30; 20 INJECTION, SOLUTION INTRAVENOUS at 10:40

## 2023-10-26 RX ADMIN — PHENYLEPHRINE HYDROCHLORIDE 200 MCG: 10 INJECTION INTRAVENOUS at 12:40

## 2023-10-26 RX ADMIN — PHENYLEPHRINE HYDROCHLORIDE 100 MCG: 10 INJECTION INTRAVENOUS at 12:25

## 2023-10-26 RX ADMIN — GLYCOPYRROLATE 0.2 MG: 0.2 INJECTION, SOLUTION INTRAMUSCULAR; INTRAVENOUS at 12:17

## 2023-10-26 RX ADMIN — FENTANYL CITRATE 100 MCG: 50 INJECTION INTRAMUSCULAR; INTRAVENOUS at 12:17

## 2023-10-26 RX ADMIN — LIDOCAINE HYDROCHLORIDE 30 MG: 20 INJECTION, SOLUTION INFILTRATION; PERINEURAL at 12:17

## 2023-10-26 RX ADMIN — GLYCOPYRROLATE 0.2 MG: 0.2 INJECTION, SOLUTION INTRAMUSCULAR; INTRAVENOUS at 12:43

## 2023-10-26 RX ADMIN — Medication 50 MCG/MIN: at 12:55

## 2023-10-26 RX ADMIN — PHENYLEPHRINE HYDROCHLORIDE 100 MCG: 10 INJECTION INTRAVENOUS at 12:22

## 2023-10-26 RX ADMIN — DEXAMETHASONE SODIUM PHOSPHATE 8 MG: 4 INJECTION, SOLUTION INTRA-ARTICULAR; INTRALESIONAL; INTRAMUSCULAR; INTRAVENOUS; SOFT TISSUE at 12:17

## 2023-10-26 RX ADMIN — FUROSEMIDE 10 MG: 10 INJECTION, SOLUTION INTRAVENOUS at 12:29

## 2023-10-26 RX ADMIN — FENTANYL CITRATE 100 MCG: 50 INJECTION INTRAMUSCULAR; INTRAVENOUS at 12:14

## 2023-10-26 RX ADMIN — PHENYLEPHRINE HYDROCHLORIDE 200 MCG: 10 INJECTION INTRAVENOUS at 12:49

## 2023-10-26 RX ADMIN — KETOROLAC TROMETHAMINE 30 MG: 30 INJECTION, SOLUTION INTRAMUSCULAR at 12:17

## 2023-10-26 RX ADMIN — PHENYLEPHRINE HYDROCHLORIDE 100 MCG: 10 INJECTION INTRAVENOUS at 12:27

## 2023-10-26 RX ADMIN — GLYCOPYRROLATE 0.2 MG: 0.2 INJECTION, SOLUTION INTRAMUSCULAR; INTRAVENOUS at 12:25

## 2023-10-26 ASSESSMENT — ACTIVITIES OF DAILY LIVING (ADL)
ADLS_ACUITY_SCORE: 35

## 2023-10-26 NOTE — OP NOTE
[facility]  Operative Note    Pre-operative diagnosis: Kidney stone [N20.0]   Post-operative diagnosis Kidney stone   Procedure: Procedure(s):  Bilateral extracorporeal shockwave lithotripsy  Cystoscopy with left retrograde pyelogram and left ureteral stent placement  Fluoroscopic interpretation <1 hour physician time   Surgeon: Omari Melvin MD   Assistants(s): none   Anesthesia: General    Estimated blood loss: Minimal    Total IV fluids: (See anesthesia record)   Blood transfusion: No transfusion was given during surgery   Total urine output: Not measured   Drains: Left ureteral stent   Specimens: none   Implants: Implant Name Type Inv. Item Serial No.  Lot No. LRB No. Used Action   STENT URETERAL POLARIS ULTRA [id number] - [id number] Stent STENT URETERAL POLARIS ULTRA [id number]  [employer] [id number] Left 1 Implanted          Findings:   Cystoscopy: 3 cm prostatic urethra, moderate bilobar prostatic hypertrophy, mild trabeculation, no concerning bladder lesions, no bladder stones    Stor Modulith SLX    Left: large 1.5 cm upper pole stone, stent placed, 2400 shocks total, good fragmentation seen under fluoro    Right: three small upper pole stones, 1600 shocks total, good fragmentation       Complications: None.   Condition: Stable               Description of procedure:  64 yo M with h/o spontaneously passed nephrolithiasis, with bilateral stone burden L>R who after discussion of the risks and benefits opts to undergo bilateral ESWL with left stent placement due to stone burden with possible need for follow up left ureteroscopy. Risks of ESWL including bleeding, pain, incomplete stone clearance, [place]str[place], need for secondary procedure discussed. Notably he also has microhematuria which is most likely due to his stones. Informed consent obtained    The patient was brought to OR#3. Ancef abx were given prior to the procedure. General  "anesthesia was induced, he was placed in dorsal lithotomy position on the lithotripter table, prepped and and draped in standard sterile fashion. A timeout was performed    A 22 Fr Storz cystoscope was assembled and passed through the urethra with the above noted findings. The left ureteral was identified and cannulated with a 5 Fr tigertail catheter. A  film showed a large stone in the left upper pole. A gentle left retrograde pyelogram was performed which confirmed the upper pole location of the stone. A 0.035\" stiff shaft glidewire was passed up to the renal pelvis and the tigertail removed. A stent was then placed in the usual fashion under cystoscopic and fluoroscopic guidance. The scope was removed and a Burk was placed and left to gravity drainage    Patient was put back in supine position and his flank was coupled to the therapy head using sterile water. Biplanar fluoroscopy was used to place the F2 focal point of the lithotripter on the stone. Lithotripsy then commenced at low power and 60 shocks per minute. A renoprotective pause was performed after 200 shocks. Lithotripsy was resumed and power was ramped up and rate increased up to 90 shocks per minute. The stone was seen to break up nicely under fluoroscopy.    Attention was turned to the right side. Biplanar fluoroscopy revealed three smaller stones in the right upper pole. Lithotripsy was then performed on the right side in a similar fashion as to the left, with the exception that each small stone was targeted for 350-500 shocks prior to moving on to another target. A total of 1600 shocks were delivered on this side with no further obvious stones visible. There was excellent fragmentation. There were additional stones seen on CT which were not visible under fluoro so the the procedure was terminated     The Burk was removed    Patient was cleaned up, awoken from anesthesia and brought to PACU in stable condition    Omari Melvin MD   M " [facility]  [phone] [facility] phone

## 2023-10-26 NOTE — OR NURSING
Pt unable to empty bladder with voiding.  Dr. Melvin notified and wanted pt to have a larkin catheter until Monday.  Pt did not agree to that plan.  Dr. Melvin again notified after pt agreed to have catheter in until tomorrow.  Larkin catheter placed with 800cc of red clear urine returned.  Pt understands instructions with larkin catheter and will RTC in [place] tomorrow for voiding trial.

## 2023-10-26 NOTE — ANESTHESIA PREPROCEDURE EVALUATION
Anesthesia Pre-Procedure Evaluation    Patient: Ryder Zepeda   MRN: [id number] : 1960        Procedure : Procedure(s):  Bilateral extracorporeal shockwave lithotripsy  Cystoscopy with left retrograde pyelogram and left ureteral stent placement          Past Medical History:   Diagnosis Date    Renal disease     Kidney Stones    Shingles       Past Surgical History:   Procedure Laterality Date    COLONOSCOPY      deviated septum        No Known Allergies   Social History     Tobacco Use    Smoking status: Never    Smokeless tobacco: Never   Substance Use Topics    Alcohol use: Yes     Comment: Once a week      Wt Readings from Last 1 Encounters:   10/17/23 72.5 kg (159 lb 14.4 oz)        Anesthesia Evaluation   Pt has had prior anesthetic. Type: General.    No history of anesthetic complications       ROS/MED HX  ENT/Pulmonary:  - neg pulmonary ROS     Neurologic:  - neg neurologic ROS     Cardiovascular:     (+) Dyslipidemia - -   -  - -                                      METS/Exercise Tolerance:     Hematologic:  - neg hematologic  ROS     Musculoskeletal:  - neg musculoskeletal ROS     GI/Hepatic:  - neg GI/hepatic ROS     Renal/Genitourinary:     (+)       Nephrolithiasis ,       Endo:  - neg endo ROS     Psychiatric/Substance Use:  - neg psychiatric ROS     Infectious Disease:  - neg infectious disease ROS     Malignancy:  - neg malignancy ROS     Other:  - neg other ROS          Physical Exam    Airway        Mallampati: II   TM distance: > 3 FB   Neck ROM: full   Mouth opening: > 3 cm    Respiratory Devices and Support         Dental       (+) Minor Abnormalities - some fillings, tiny chips      Cardiovascular   cardiovascular exam normal       Rhythm and rate: regular and normal     Pulmonary   pulmonary exam normal        breath sounds clear to auscultation       Other findings: Lab Test        07/25/23     10/09/20     12/04/19                       1509          1028          0146         "  WBC          7.9          6.9          11.2*         HGB          13.3         13.9         13.3          MCV          94           95           95            PLT          314          308          337            Lab Test        10/17/23     10/09/20     12/04/19                       1216          1028          0146          NA           140          138          140           POTASSIUM    4.6          4.3          4.0           CHLORIDE     101          108          108           CO2          27           24           24            BUN          12.0         12           19            CR           0.91         0.94         1.14          ANIONGAP     12           6            8             LINO          9.7          9.2          9.0           GLC          94           90           144*                 EKG Interpretation:   Sinus rhythm with sinus arrhythmia  Normal ECG   Remote ECG      OUTSIDE LABS:  CBC:   Lab Results   Component Value Date    WBC 7.9 07/25/2023    WBC 6.9 10/09/2020    HGB 13.3 07/25/2023    HGB 13.9 10/09/2020    HCT 39.6 (L) 07/25/2023    HCT 42.1 10/09/2020     07/25/2023     10/09/2020     BMP:   Lab Results   Component Value Date     10/17/2023     10/09/2020    POTASSIUM 4.6 10/17/2023    POTASSIUM 4.3 10/09/2020    CHLORIDE 101 10/17/2023    CHLORIDE 108 10/09/2020    CO2 27 10/17/2023    CO2 24 10/09/2020    BUN 12.0 10/17/2023    BUN 12 10/09/2020    CR 0.91 10/17/2023    CR 0.94 10/09/2020    GLC 94 10/17/2023    GLC 90 10/09/2020     COAGS: No results found for: \"PTT\", \"INR\", \"FIBR\"  POC: No results found for: \"BGM\", \"HCG\", \"HCGS\"  HEPATIC:   Lab Results   Component Value Date    ALBUMIN 4.7 10/17/2023    PROTTOTAL 7.9 10/17/2023    ALT 25 10/17/2023    AST 25 10/17/2023    ALKPHOS 89 10/17/2023    BILITOTAL 0.4 10/17/2023     OTHER:   Lab Results   Component Value Date    A1C 5.0 10/09/2020    LINO 9.7 10/17/2023       Anesthesia Plan    ASA Status:  2    NPO " Status:  NPO Appropriate    Anesthesia Type: General.     - Airway: LMA   Induction: Intravenous.   Maintenance: Balanced.        Consents    Anesthesia Plan(s) and associated risks, benefits, and realistic alternatives discussed. Questions answered and patient/representative(s) expressed understanding.     - Discussed: Risks, Benefits and Alternatives for BOTH SEDATION and the PROCEDURE were discussed     - Discussed with:  Patient      - Extended Intubation/Ventilatory Support Discussed: No.      - Patient is DNR/DNI Status: No     Use of blood products discussed: No .     Postoperative Care    Pain management: IV analgesics, Oral pain medications, Multi-modal analgesia.   PONV prophylaxis: Ondansetron (or other 5HT-3), Dexamethasone or Solumedrol, Background Propofol Infusion     Comments:                Wilian Edwards MD

## 2023-10-26 NOTE — ANESTHESIA PROCEDURE NOTES
Airway       Patient location during procedure: OR       Procedure Start/Stop Times: 10/26/2023 12:18 PM  Staff -        CRNA: Nadiya Sharma APRN CRNA       Performed By: CRNA  Consent for Airway        Urgency: elective  Indications and Patient Condition       Indications for airway management: jigar-procedural       Induction type:intravenous       Mask difficulty assessment: 0 - not attempted    Final Airway Details       Final airway type: supraglottic airway    Supraglottic Airway Details        Type: LMA       Brand: I-Gel       LMA size: 5    Post intubation assessment        Placement verified by: capnometry, equal breath sounds and chest rise        Number of attempts at approach: 1       Number of other approaches attempted: 0       Secured with: plastic tape       Ease of procedure: easy       Dentition: Intact and Unchanged    Medication(s) Administered   Medication Administration Time: 10/26/2023 12:18 PM

## 2023-10-26 NOTE — PROGRESS NOTES
Notified MD at 1710 PM regarding low urine output.  Pt voiding small amounts. Bladder scan at 1600 was 438cc. Pt voided two more times after this 50cc each time. Bladder scan at 1710 was 667cc.     Spoke with: Dr Melvin    Orders were obtained.    Comments: send pt home with larkin and voiding trial Monday

## 2023-10-26 NOTE — ANESTHESIA POSTPROCEDURE EVALUATION
Patient: Ryder Zepeda    Procedure: Procedure(s):  Bilateral extracorporeal shockwave lithotripsy  Cystoscopy with left retrograde pyelogram and left ureteral stent placement       Anesthesia Type:  General    Note:  Disposition: Outpatient   Postop Pain Control: Uneventful            Sign Out: Well controlled pain   PONV: No   Neuro/Psych: Uneventful            Sign Out: Acceptable/Baseline neuro status   Airway/Respiratory: Uneventful            Sign Out: Acceptable/Baseline resp. status   CV/Hemodynamics: Uneventful            Sign Out: Acceptable CV status; No obvious hypovolemia; No obvious fluid overload   Other NRE: NONE   DID A NON-ROUTINE EVENT OCCUR? No           Last vitals:  Vitals Value Taken Time   /76 10/26/23 1410   Temp 96.8  F (36  C) 10/26/23 1402   Pulse 64 10/26/23 1413   Resp 10 10/26/23 1413   SpO2 100 % 10/26/23 1413   Vitals shown include unfiled device data.    Electronically Signed By: Wilian Edwards MD  October 26, 2023  2:14 PM

## 2023-10-26 NOTE — DISCHARGE INSTRUCTIONS
POSTOPERATIVE INSTRUCTIONS    Diagnosis-------------------------------   Bilateral nephrolithiasis    Procedure-------------------------------  Procedure(s) (LRB):  Bilateral extracorporeal shockwave lithotripsy (Left)  Cystoscopy with left retrograde pyelogram and left ureteral stent placement (Left)      Findings--------------------------------  Cystoscopy: 3 cm prostatic urethra, moderate bilobar prostatic hypertrophy, mild trabeculation, no concerning bladder lesions, no bladder stones     [place] Picaticith SLX     Left: large 1.5 cm upper pole stone, stent placed, 2400 shocks total, good fragmentation seen under fluoro     Right: three small upper pole stones, 1600 shocks total, good fragmentation    Home-going instructions-----------------         Activity Limitation:     - No driving or operating heavy machinery until 24 hours after anesthesia  - no heavy lifting over 10 pounds or strenuous activity for 48 hours  - for the first two weeks after surgery, strenuous activity may cause increased blood in your urine    FOLLOW THESE INSTRUCTIONS AS INDICATED BELOW:  - Observe operative area for signs of excessive bleeding.  - You may shower.  - Increase fluid intake to promote clear urine.  - Resume usual diet as tolerated  - strain your urine for the first week after surgery, collect any fragments, dry them out and submit them to a [place] laboratory for stone analysis    What to expect while recovering-----------  - You may experience some intermittent bleeding that makes your urine pink or cherry colored. This is normal.  - However, if you are unable to urinate, passing large amount of clots, have devon blood in your urine, or have a temperature >101 degrees, call the urology nurse on call, or present to your nearest emergency department.  - You are encouraged to walk daily  -   A URETERAL STENT has been placed that allows urine to flow unobstructed from your kidney into your bladder.  The stent has a curl in the  kidney and a curl in the bladder.  The curl in the bladder can cause some urgency and frequency of urination as well as some mild blood in the urine.  The curl in the kidney can cause some mild flank discomfort.  This may be more noticeable when you urinate.  A URETERAL STENT is meant to be left in temporarily.  It must be removed or changed no later than 6 months after its insertion.  If it's not removed it can result in stone overgrowth on the stent that can cause pain, infection, and can be very difficult to remove.      Discharge Medications/instructions:   - Flomax (tamsulosin) to be taken daily to help with passage of stone fragments  - Take Tylenol 1000mg every 6 hours for pain  - Take oxycodone as needed for pain not relieved by Tylenol  - Take colace (a stool softener) if you are taking oxycodone, to prevent constipation      Questions/concerns------------------------  [facility]: [phone]    Future appointments  We will schedule you for a for a cystoscopy and stent removal in about a week, with a KUB x-ray 1-2 days prior    Omari Melvin MD    You received Toradol, an IV form of Ibuprofen (Motrin) at 12:17 PM.  Do not take any Ibuprofen products until 08:30 PM.     GENERAL ANESTHESIA OR SEDATION ADULT DISCHARGE INSTRUCTIONS   SPECIAL PRECAUTIONS FOR 24 HOURS AFTER SURGERY    IT IS NOT UNUSUAL TO FEEL LIGHT-HEADED OR FAINT, UP TO 24 HOURS AFTER SURGERY OR WHILE TAKING PAIN MEDICATION.  IF YOU HAVE THESE SYMPTOMS; SIT FOR A FEW MINUTES BEFORE STANDING AND HAVE SOMEONE ASSIST YOU WHEN YOU GET UP TO WALK OR USE THE BATHROOM.    YOU SHOULD REST AND RELAX FOR THE NEXT 24 HOURS AND YOU MUST MAKE ARRANGEMENTS TO HAVE SOMEONE STAY WITH YOU FOR AT LEAST 24 HOURS AFTER YOUR DISCHARGE.  AVOID HAZARDOUS AND STRENUOUS ACTIVITIES.  DO NOT MAKE IMPORTANT DECISIONS FOR 24 HOURS.    DO NOT DRIVE ANY VEHICLE OR OPERATE MECHANICAL EQUIPMENT FOR 24 HOURS FOLLOWING THE END OF YOUR SURGERY.   EVEN THOUGH YOU MAY FEEL NORMAL, YOUR REACTIONS MAY BE AFFECTED BY THE MEDICATION YOU HAVE RECEIVED.    DO NOT DRINK ALCOHOLIC BEVERAGES FOR 24 HOURS FOLLOWING YOUR SURGERY.    DRINK CLEAR LIQUIDS (APPLE JUICE, GINGER ALE, 7-UP, BROTH, ETC.).  PROGRESS TO YOUR REGULAR DIET AS YOU FEEL ABLE.    YOU MAY HAVE A DRY MOUTH, A SORE THROAT, MUSCLES ACHES OR TROUBLE SLEEPING.  THESE SHOULD GO AWAY AFTER 24 HOURS.    CALL YOUR DOCTOR FOR ANY OF THE FOLLOWING:  SIGNS OF INFECTION (FEVER, GROWING TENDERNESS AT THE SURGERY SITE, A LARGE AMOUNT OF DRAINAGE OR BLEEDING, SEVERE PAIN, FOUL-SMELLING DRAINAGE, REDNESS OR SWELLING.    IT HAS BEEN OVER 8 TO 10 HOURS SINCE SURGERY AND YOU ARE STILL NOT ABLE TO URINATE (PASS WATER).      Discharge with larkin, voiding trial Monday.

## 2023-10-26 NOTE — ANESTHESIA CARE TRANSFER NOTE
Patient: Ryder Zepeda    Procedure: Procedure(s):  Bilateral extracorporeal shockwave lithotripsy  Cystoscopy with left retrograde pyelogram and left ureteral stent placement       Diagnosis: Kidney stone [N20.0]  Diagnosis Additional Information: No value filed.    Anesthesia Type:   General     Note:    Oropharynx: oropharynx clear of all foreign objects  Level of Consciousness: drowsy  Oxygen Supplementation: face mask  Level of Supplemental Oxygen (L/min / FiO2): 6  Independent Airway: airway patency satisfactory and stable  Dentition: dentition unchanged  Vital Signs Stable: post-procedure vital signs reviewed and stable  Report to RN Given: handoff report given  Patient transferred to: PACU    Handoff Report: Identifed the Patient, Identified the Reponsible Provider, Reviewed the pertinent medical history, Discussed the surgical course, Reviewed Intra-OP anesthesia mangement and issues during anesthesia, Set expectations for post-procedure period and Allowed opportunity for questions and acknowledgement of understanding    Vitals:  Vitals Value Taken Time   BP     Temp     Pulse 66 10/26/23 1401   Resp 11 10/26/23 1401   SpO2 100 % 10/26/23 1401   Vitals shown include unfiled device data.    Electronically Signed By: Dean Dennis Severson, APRN CRNA  October 26, 2023  2:03 PM

## 2023-10-27 ENCOUNTER — OFFICE VISIT (OUTPATIENT)
Dept: UROLOGY | Facility: CLINIC | Age: 63
End: 2023-10-27
Payer: COMMERCIAL

## 2023-10-27 DIAGNOSIS — R33.9 URINARY RETENTION: Primary | ICD-10-CM

## 2023-10-27 PROCEDURE — 99207 PR NO CHARGE NURSE ONLY: CPT | Performed by: STUDENT IN AN ORGANIZED HEALTH CARE EDUCATION/TRAINING PROGRAM

## 2023-10-27 NOTE — LETTER
10/27/2023       RE: Ryder Zepeda  [address] [facility] [address]     Dear Colleague,    Thank you for referring your patient, Ryder Zepeda, to the [facility] UROLOGY CLINIC [place] at [facility]. Please see a copy of my visit note below.    Ryder Zepeda comes into clinic today for Urinary Retention  at the request of Dr. Melvin, Ordering Provider for Trial of Void.    Patient's catheter was disconnected from leg bag, a sterile tip syringe was attached to catheter end, and 250 mL of sterile water was instilled via gravity into the bladder.  Removed Burk catheter after deflating balloon completely.  Patient voided 150 mL     Patient allowed to go home without catheter.    Patient was instructed to call our office during office hours if unable to urinate, or to go to the ER if not during office hours.    Pt has one tablet of Cipro 500mg to take today after catheter removal  Pt has an appointment for stent removal in 1 week    This service provided today was under the supervising provider of the day Dr. Melvin, who was available if needed.    Oliva Peraza, EMT    Omari Melvin MD

## 2023-10-27 NOTE — PROGRESS NOTES
Ryder Zepeda comes into clinic today for Urinary Retention  at the request of Dr. Melvin, Ordering Provider for Trial of Void.    Patient's catheter was disconnected from leg bag, a sterile tip syringe was attached to catheter end, and 250 mL of sterile water was instilled via gravity into the bladder.  Removed Burk catheter after deflating balloon completely.  Patient voided 150 mL     Patient allowed to go home without catheter.    Patient was instructed to call our office during office hours if unable to urinate, or to go to the ER if not during office hours.    Pt has one tablet of Cipro 500mg to take today after catheter removal  Pt has an appointment for stent removal in 1 week    This service provided today was under the supervising provider of the day Dr. Melvin, who was available if needed.    Oliva Peraza, EMT

## 2023-10-31 ENCOUNTER — ANCILLARY PROCEDURE (OUTPATIENT)
Dept: GENERAL RADIOLOGY | Facility: CLINIC | Age: 63
End: 2023-10-31
Attending: STUDENT IN AN ORGANIZED HEALTH CARE EDUCATION/TRAINING PROGRAM
Payer: COMMERCIAL

## 2023-10-31 DIAGNOSIS — N20.0 BILATERAL NEPHROLITHIASIS: ICD-10-CM

## 2023-10-31 PROCEDURE — 74018 RADEX ABDOMEN 1 VIEW: CPT | Mod: TC | Performed by: RADIOLOGY

## 2023-11-02 ENCOUNTER — OFFICE VISIT (OUTPATIENT)
Dept: UROLOGY | Facility: CLINIC | Age: 63
End: 2023-11-02
Payer: COMMERCIAL

## 2023-11-02 VITALS
HEIGHT: 68 IN | DIASTOLIC BLOOD PRESSURE: 74 MMHG | WEIGHT: 160 LBS | SYSTOLIC BLOOD PRESSURE: 124 MMHG | BODY MASS INDEX: 24.25 KG/M2

## 2023-11-02 DIAGNOSIS — Z79.2 PROPHYLACTIC ANTIBIOTIC: ICD-10-CM

## 2023-11-02 DIAGNOSIS — N20.0 KIDNEY STONE: Primary | ICD-10-CM

## 2023-11-02 PROCEDURE — 52310 CYSTOSCOPY AND TREATMENT: CPT | Mod: 58 | Performed by: STUDENT IN AN ORGANIZED HEALTH CARE EDUCATION/TRAINING PROGRAM

## 2023-11-02 RX ORDER — CIPROFLOXACIN 500 MG/1
500 TABLET, FILM COATED ORAL ONCE
Qty: 1 TABLET | Refills: 0 | Status: SHIPPED | OUTPATIENT
Start: 2023-11-02 | End: 2023-11-02

## 2023-11-02 ASSESSMENT — PAIN SCALES - GENERAL: PAINLEVEL: MILD PAIN (3)

## 2023-11-02 NOTE — PROGRESS NOTES
"CHIEF COMPLAINT   Ryder Zepeda who is a 63 year old male returns today for follow-up of nephrolithiasis s/p bilateral ESWL w/ left stent placement 10/26/2023      HPI   Ryder Zepeda is a 63 year old male who presents with a history of bilateral ESWL w/ left stent placement 10/26/2023.    Saw some debris in urine on POD#1 but wasn't able to strain it, hasn't seen much since      PHYSICAL EXAM  Patient is a 63 year old  male   Vitals: Blood pressure 124/74, height 1.727 m (5' 8\"), weight 72.6 kg (160 lb).  Body mass index is 24.33 kg/m .  General Appearance Adult:   Alert, no acute distress, oriented  HENT: throat/mouth:normal, good dentition  Lungs: no respiratory distress, or pursed lip breathing  Heart: No obvious jugular venous distension present  Abdomen: nondistended  Musculoskeltal: extremities normal, no peripheral edema  Skin: no suspicious lesions or rashes  Neuro: Alert, oriented, speech and mentation normal  Psych: affect and mood normal  Gait: Normal  : circ phallus    All pertinent imaging reviewed:    Kub 10/31/2023    Reviewed and interpreted personally. Agree with radiologist interpretation of tiny stone fragments overlying the left kidney, no significant stones seen on the right side. Stent in place in good position    PRE-PROCEDURE DIAGNOSIS: bilateral nephrolithiasis    POST-PROCEDURE DIAGNOSIS: bilateral nephrolithiasis    PROCEDURE: Cystoscopy with left ureteral stent removal    DESCRIPTION OF PROCEDURE: After informed consent was obtained, the patient was brought to the procedure room where he was placed in the supine position with all pressure points well padded.  The penis was prepped and draped in sterile fashion. A flexible cystoscope was introduced through a well-lubricated urethra. There was a small amount of dependent stone debris. The stent was visualized, grasped with a flexible grasper and removed intact and discarded    The flexible cystoscope was removed and the findings " were described to the patient.       ASSESSMENT and PLAN  63 year old male who presents with a history of bilateral ESWL w/ left stent placement 10/26/2023.    Stent removed today. Has some stone debris seen in left kidney on kub but should pass spontaneously    Discussed stone prevention diet in general, handout given.    Follow up in 2-3 months with litholink x2, kub prior; hopefully by that time has submitted stones for analysis        Omari Melvin MD   [facility] Urology  [facility] Phone: [phone]

## 2023-11-02 NOTE — LETTER
"11/2/2023       RE: Ryder Zepeda  [address] [facility] [address]     Dear Colleague,    Thank you for referring your patient, Ryder Zepeda, to the [facility] UROLOGY CLINIC [place] at [facility]. Please see a copy of my visit note below.    CHIEF COMPLAINT   Ryder Zepeda who is a 63 year old male returns today for follow-up of nephrolithiasis s/p bilateral ESWL w/ left stent placement 10/26/2023      HPI   Ryder Zepeda is a 63 year old male who presents with a history of bilateral ESWL w/ left stent placement 10/26/2023.    Saw some debris in urine on POD#1 but wasn't able to strain it, hasn't seen much since      PHYSICAL EXAM  Patient is a 63 year old  male   Vitals: Blood pressure 124/74, height 1.727 m (5' 8\"), weight 72.6 kg (160 lb).  Body mass index is 24.33 kg/m .  General Appearance Adult:   Alert, no acute distress, oriented  HENT: throat/mouth:normal, good dentition  Lungs: no respiratory distress, or pursed lip breathing  Heart: No obvious jugular venous distension present  Abdomen: nondistended  Musculoskeltal: extremities normal, no peripheral edema  Skin: no suspicious lesions or rashes  Neuro: Alert, oriented, speech and mentation normal  Psych: affect and mood normal  Gait: Normal  : circ phallus    All pertinent imaging reviewed:    Kub 10/31/2023    Reviewed and interpreted personally. Agree with radiologist interpretation of tiny stone fragments overlying the left kidney, no significant stones seen on the right side. Stent in place in good position    PRE-PROCEDURE DIAGNOSIS: bilateral nephrolithiasis    POST-PROCEDURE DIAGNOSIS: bilateral nephrolithiasis    PROCEDURE: Cystoscopy with left ureteral stent removal    DESCRIPTION OF PROCEDURE: After informed consent was obtained, the patient was brought to the procedure room where he was placed in the supine position with all pressure points well padded.  The penis " was prepped and draped in sterile fashion. A flexible cystoscope was introduced through a well-lubricated urethra. There was a small amount of dependent stone debris. The stent was visualized, grasped with a flexible grasper and removed intact and discarded    The flexible cystoscope was removed and the findings were described to the patient.       ASSESSMENT and PLAN  63 year old male who presents with a history of bilateral ESWL w/ left stent placement 10/26/2023.    Stent removed today. Has some stone debris seen in left kidney on kub but should pass spontaneously    Discussed stone prevention diet in general, handout given.    Follow up in 2-3 months with litholink x2, kub prior; hopefully by that time has submitted stones for analysis        Omari Melvin MD   [facility] Urology  [facility] Phone: [phone]

## 2023-11-13 NOTE — ADDENDUM NOTE
Addendum  created 11/13/23 0651 by Wilian Edwards MD    Intraprocedure Event deleted, Intraprocedure Event edited, Intraprocedure Staff edited

## 2024-02-06 ENCOUNTER — HOSPITAL ENCOUNTER (OUTPATIENT)
Dept: GENERAL RADIOLOGY | Facility: CLINIC | Age: 64
Discharge: HOME | End: 2024-02-06
Attending: STUDENT IN AN ORGANIZED HEALTH CARE EDUCATION/TRAINING PROGRAM | Admitting: STUDENT IN AN ORGANIZED HEALTH CARE EDUCATION/TRAINING PROGRAM
Payer: COMMERCIAL

## 2024-02-06 DIAGNOSIS — N20.0 KIDNEY STONE: ICD-10-CM

## 2024-02-06 PROCEDURE — 74018 RADEX ABDOMEN 1 VIEW: CPT

## 2024-02-12 ENCOUNTER — TRANSFERRED RECORDS (OUTPATIENT)
Dept: HEALTH INFORMATION MANAGEMENT | Facility: CLINIC | Age: 64
End: 2024-02-12
Payer: COMMERCIAL

## 2024-02-27 ENCOUNTER — OFFICE VISIT (OUTPATIENT)
Dept: UROLOGY | Facility: CLINIC | Age: 64
End: 2024-02-27
Payer: COMMERCIAL

## 2024-02-27 VITALS
HEIGHT: 68 IN | BODY MASS INDEX: 24.25 KG/M2 | SYSTOLIC BLOOD PRESSURE: 150 MMHG | WEIGHT: 160 LBS | DIASTOLIC BLOOD PRESSURE: 90 MMHG

## 2024-02-27 DIAGNOSIS — Z87.442 HISTORY OF NEPHROLITHIASIS: Primary | ICD-10-CM

## 2024-02-27 PROCEDURE — 99213 OFFICE O/P EST LOW 20 MIN: CPT | Performed by: NURSE PRACTITIONER

## 2024-02-27 ASSESSMENT — PAIN SCALES - GENERAL: PAINLEVEL: NO PAIN (0)

## 2024-02-27 NOTE — PATIENT INSTRUCTIONS
OXALATE DIET    Beverages to avoid:  Draft beer  Ovaltine  Cocoa    Recommended beverages:  Coffee  Beer (bottle)  Carbonated soda  Distilled alcohol  Lemonade  Wine: red, gilberto, white  Buttermilk, Whole, lowfat or skim milk  Yogurt with allowed  fruits  Soy, almond and rice  Milk    Vegetables to avoid:  Beets**  greens  Collards  Kale  Leeks  Mustard greens  Okra  Parsley**  Sweet potato**  Rutabagas  Spinach**  Swiss chard**  Watercress**    Recommended vegetables:  Asparagus  Broccoli  Carrots  Corn: sweet, white  Cucumber, peeled  Green peas, canned  Lettuce  Lima beans  Parsnips  Tomato, 1 small, juice  Turnips  Avocado  [place] sprouts  Cauliflower  Cabbage  Mushrooms  Onions  Peas, green  White potato  Radish    Recommended Meat / Meat Substitutes:  Eggs  Cheese  Beef, lamb or pork  Poultry  Fish and shellfish  Sardines    Miscellaneous foods to avoid:  Nuts**  Peanuts, almonds,  pecans, cashews  Chocolate**,  Cocoa,**  Vegetable soup,  Marmalade  Peanut butter    Miscellaneous recommend foods:  [place]  Mayonnaise  Salad dressing  Vegetable oils  Butter, margarine  Coconut  Jelly or preserves (made with allowed  fruits)  Lemon, lime juice  Salt, pepper  Cornbread  Sponge cake  Spaghetti, canned in tomato sauce  Rice  Quinoa  All bread    Fruits to avoid:  Currants, red  Dewberries  Grapes, purple  Gooseberries  Lemon peel**  Lime peel**  Orange peel**  Rhubarb**    Recommended fruits:  Apple  Apricots  Cherries, red, sour  Cranberry juice  Grape juice  Orange, fruit and juice  Peaches  Pears  Pineapple, plum, purple  Prunes  Apple juice  Banana  Cherries, hector  Mangos  Melons, cantaloupe, cassava honeydew,  watermelon  Nectarines  Peaches  Pineapple juice  Plums, green or yellow    ** = very high in oxalate         A diet high in salt (sodium) causes calcium to build in your urine. Too much calcium in your urine can lead to new stones. Avoiding highly processed foods like fast food is the best way to reduce  sodium intake. You can use as much table salt as you like.    Calcium is a nutrient that is found in dairy products, such as yogurt, milk and cheese. You need to eat calcium so that it can bind with oxalate in the stomach and intestines before it moves to the kidneys. Eating foods with calcium is a good way for oxalates to leave the body and not form stones. The best way to get calcium into your body is through the foods you eat.     Avoid vitamin C supplements.    Drink enough fluids. The number one thing you can do is to drink enough fluids, like water. Your urine should ideally be a light straw color. Drinking enough fluids will dilute your urine and make it harder for chemicals to form stones.

## 2024-02-27 NOTE — NURSING NOTE
Chief Complaint   Patient presents with    Kidney Stone Related     Pt has no concerns with urination.   Denies gross hematuria or flank pain    Oliva Peraza, EMT

## 2024-02-27 NOTE — LETTER
2/27/2024       RE: Ryder Zepeda  [address] [facility] [address]     Dear Colleague,    Thank you for referring your patient, Ryder Zepeda, to the [facility] UROLOGY CLINIC [place] at [facility]. Please see a copy of my visit note below.      Urology Outpatient Visit    Name: Ryder Zepeda    MRN: [id number]   YOB: 1960               Chief Complaint:   Litholink results          Impression and Plan:   Impression / Plan:   Ryder Zepeda is a 63 year old male with Hx of nephrolithiasis here for discussion on lithlolink result      -Moderate Hypocitraturia on litholink. Otherwise no significant risk factors for additional stone formation on litholink results. I mentioned that Crystal light is an excellent source of citrate, educated the patient that this helps reduce the risk of kidney stones by complexing with calcium in the urine preventing the formation of crystals. Discussed that consumption of 2-3 liters of Crystal Light Lemonade per day is recommended for stone prevention. Explained that this can be an affordable alternative to potassium citrate tablets which are often prescribed in this particular scenario.  -XR KUB in 1 yr to monitor stone burden.     Thank you for the opportunity to participate in the care of Ryder Zepeda.     MAYANK Dallas, CNP  [facility] - Department of Urology  [phone]          History of Present Illness:   Ryder Zepeda is a 63 year old male with Hx of nephrolithiasis here for discussion on lithlolink result        History is obtained from patient & EMR    Pertinent imaging reviewed:  XR KUB 2/6/2024 3:40 PM   IMPRESSION: Left ureteral stent has been removed in the interim. A 2  mm left upper quadrant calcification, likely corresponds to a left  renal calculus. Several of the previously seen left renal calculi are  not visible radiographically. The small right renal calculi  "seen on  the prior CT are also not visible radiographically. Moderate amount of  formed stool in the colon.          Past Medical History:     Past Medical History:   Diagnosis Date    Complication of anesthesia     unknown, per patient \"I woke up with a bunch of people surrounding me and an elevated heart rate\"    Renal disease     Kidney Stones    Shingles           Past Surgical History:     Past Surgical History:   Procedure Laterality Date    COLONOSCOPY      COMBINED CYSTOSCOPY, RETROGRADES, EXCHANGE STENT URETER(S) Left 10/26/2023    Procedure: Cystoscopy with left retrograde pyelogram and left ureteral stent placement, fluoroscopic interpretation <1 hour physician time;  Surgeon: Omari Melvin MD;  Location: [facility] OR    deviated septum      EXTRACORPOREAL SHOCK WAVE LITHOTRIPSY, CYSTOSCOPY, INSERT STENT URETER(S), COMBINED Bilateral 10/26/2023    Procedure: Bilateral extracorporeal shockwave lithotripsy;  Surgeon: Omari Melvin MD;  Location: [facility] OR          Social History:     Social History     Tobacco Use    Smoking status: Never    Smokeless tobacco: Never   Substance Use Topics    Alcohol use: Yes     Comment: Once a week          Family History:     Family History   Problem Relation Age of Onset    Leukemia Mother     Hypertension Father           Allergies:   No Known Allergies       Medications:     Current Outpatient Medications   Medication Sig    Lansoprazole (PREVACID PO) Take 1 tablet by mouth daily as needed    multivitamin, therapeutic (THERA-VIT) TABS tablet Take 1 tablet by mouth daily    acetaminophen (TYLENOL) 500 MG tablet Take 2 tablets (1,000 mg) by mouth every 6 hours as needed for mild pain (Patient not taking: Reported on 11/2/2023)    docusate sodium (COLACE) 100 MG capsule Take 1 capsule (100 mg) by mouth 2 times daily (Patient not taking: Reported on 11/2/2023)    pseudoePHEDrine (SUDAFED) 30 MG tablet Take by mouth every 4 hours as needed for congestion (Patient not taking: " Reported on 11/2/2023)    tamsulosin (FLOMAX) 0.4 MG capsule Take 1 capsule (0.4 mg) by mouth daily While the stent is in place, for stent pain and irritation (Patient not taking: Reported on 11/2/2023)     No current facility-administered medications for this visit.          Review of Systems:    ROS: See HPI for pertinent details.  Remainder of 10-point ROS negative.         Physical Exam:   VS:  T: Data Unavailable    HR: Data Unavailable    BP: 150/90    RR: Data Unavailable     GEN:  Alert.  NAD.   HEENT:  Sclerae anicteric.    CV:  No obvious jugular venous distension.  LUNGS: No respiratory distress, breathing comfortably wo accessory muscle use.  ABD:  ND.     SKIN:  Dry. No visible rashes.   NEURO:  CN grossly intact.           Data:   All laboratory data reviewed:    Lab Results   Component Value Date    PSA 1.18 10/17/2023    PSA 1.41 10/09/2020     Lab Results   Component Value Date    CR 0.91 10/17/2023    CR 0.94 10/09/2020    CR 1.14 12/04/2019    CR 0.92 03/17/2007     Lab Results   Component Value Date    GFRESTIMATED >90 10/17/2023    GFRESTIMATED 87 10/09/2020    GFRESTIMATED 70 12/04/2019    GFRESTIMATED >90 03/17/2007     Color Urine (no units)   Date Value   09/15/2023 Yellow   10/09/2020 Yellow     Appearance Urine (no units)   Date Value   09/15/2023 Clear   10/09/2020 Clear     Glucose Urine (mg/dL)   Date Value   09/15/2023 Negative   10/09/2020 Negative     Bilirubin Urine (no units)   Date Value   09/15/2023 Negative   10/09/2020 Negative     Ketones Urine (mg/dL)   Date Value   09/15/2023 Trace (A)   10/09/2020 Negative     Specific Gravity Urine (no units)   Date Value   09/15/2023 >=1.030   10/09/2020 1.020     pH Urine   Date Value   09/15/2023 5.5   10/09/2020 6.0 pH     Protein Albumin Urine (mg/dL)   Date Value   09/15/2023 Negative   10/09/2020 Trace (A)     Urobilinogen Urine   Date Value   09/15/2023 0.2 E.U./dL   10/09/2020 0.2 EU/dL     Nitrite Urine (no units)   Date Value    09/15/2023 Negative   10/09/2020 Negative     Leukocyte Esterase Urine (no units)   Date Value   09/15/2023 Negative   10/09/2020 Negative

## 2024-02-27 NOTE — PROGRESS NOTES
"  Urology Outpatient Visit    Name: Ryder Zepeda    MRN: [id number]   YOB: 1960               Chief Complaint:   Litholink results          Impression and Plan:   Impression / Plan:   Ryder Zepeda is a 63 year old male with Hx of nephrolithiasis here for discussion on lithlolink result      -Moderate Hypocitraturia on litholink. Otherwise no significant risk factors for additional stone formation on litholink results. I mentioned that Crystal light is an excellent source of citrate, educated the patient that this helps reduce the risk of kidney stones by complexing with calcium in the urine preventing the formation of crystals. Discussed that consumption of 2-3 liters of Crystal Light Lemonade per day is recommended for stone prevention. Explained that this can be an affordable alternative to potassium citrate tablets which are often prescribed in this particular scenario.  -XR KUB in 1 yr to monitor stone burden.     Thank you for the opportunity to participate in the care of Ryder Zepeda.     MAYANK Dallas, CNP  [facility] - Department of Urology  [phone]          History of Present Illness:   Ryder Zepeda is a 63 year old male with Hx of nephrolithiasis here for discussion on lithlolink result        History is obtained from patient & EMR    Pertinent imaging reviewed:  XR KUB 2/6/2024 3:40 PM   IMPRESSION: Left ureteral stent has been removed in the interim. A 2  mm left upper quadrant calcification, likely corresponds to a left  renal calculus. Several of the previously seen left renal calculi are  not visible radiographically. The small right renal calculi seen on  the prior CT are also not visible radiographically. Moderate amount of  formed stool in the colon.          Past Medical History:     Past Medical History:   Diagnosis Date    Complication of anesthesia     unknown, per patient \"I woke up with a bunch of people surrounding me and an elevated heart rate\" "    Renal disease     Kidney Stones    Shingles           Past Surgical History:     Past Surgical History:   Procedure Laterality Date    COLONOSCOPY      COMBINED CYSTOSCOPY, RETROGRADES, EXCHANGE STENT URETER(S) Left 10/26/2023    Procedure: Cystoscopy with left retrograde pyelogram and left ureteral stent placement, fluoroscopic interpretation <1 hour physician time;  Surgeon: Omari Melvin MD;  Location: [facility] OR    deviated septum      EXTRACORPOREAL SHOCK WAVE LITHOTRIPSY, CYSTOSCOPY, INSERT STENT URETER(S), COMBINED Bilateral 10/26/2023    Procedure: Bilateral extracorporeal shockwave lithotripsy;  Surgeon: Omari Melvin MD;  Location: [facility] OR          Social History:     Social History     Tobacco Use    Smoking status: Never    Smokeless tobacco: Never   Substance Use Topics    Alcohol use: Yes     Comment: Once a week          Family History:     Family History   Problem Relation Age of Onset    Leukemia Mother     Hypertension Father           Allergies:   No Known Allergies       Medications:     Current Outpatient Medications   Medication Sig    Lansoprazole (PREVACID PO) Take 1 tablet by mouth daily as needed    multivitamin, therapeutic (THERA-VIT) TABS tablet Take 1 tablet by mouth daily    acetaminophen (TYLENOL) 500 MG tablet Take 2 tablets (1,000 mg) by mouth every 6 hours as needed for mild pain (Patient not taking: Reported on 11/2/2023)    docusate sodium (COLACE) 100 MG capsule Take 1 capsule (100 mg) by mouth 2 times daily (Patient not taking: Reported on 11/2/2023)    pseudoePHEDrine (SUDAFED) 30 MG tablet Take by mouth every 4 hours as needed for congestion (Patient not taking: Reported on 11/2/2023)    tamsulosin (FLOMAX) 0.4 MG capsule Take 1 capsule (0.4 mg) by mouth daily While the stent is in place, for stent pain and irritation (Patient not taking: Reported on 11/2/2023)     No current facility-administered medications for this visit.          Review of Systems:    ROS: See HPI for  pertinent details.  Remainder of 10-point ROS negative.         Physical Exam:   VS:  T: Data Unavailable    HR: Data Unavailable    BP: 150/90    RR: Data Unavailable     GEN:  Alert.  NAD.   HEENT:  Sclerae anicteric.    CV:  No obvious jugular venous distension.  LUNGS: No respiratory distress, breathing comfortably wo accessory muscle use.  ABD:  ND.     SKIN:  Dry. No visible rashes.   NEURO:  CN grossly intact.           Data:   All laboratory data reviewed:    Lab Results   Component Value Date    PSA 1.18 10/17/2023    PSA 1.41 10/09/2020     Lab Results   Component Value Date    CR 0.91 10/17/2023    CR 0.94 10/09/2020    CR 1.14 12/04/2019    CR 0.92 03/17/2007     Lab Results   Component Value Date    GFRESTIMATED >90 10/17/2023    GFRESTIMATED 87 10/09/2020    GFRESTIMATED 70 12/04/2019    GFRESTIMATED >90 03/17/2007     Color Urine (no units)   Date Value   09/15/2023 Yellow   10/09/2020 Yellow     Appearance Urine (no units)   Date Value   09/15/2023 Clear   10/09/2020 Clear     Glucose Urine (mg/dL)   Date Value   09/15/2023 Negative   10/09/2020 Negative     Bilirubin Urine (no units)   Date Value   09/15/2023 Negative   10/09/2020 Negative     Ketones Urine (mg/dL)   Date Value   09/15/2023 Trace (A)   10/09/2020 Negative     Specific Gravity Urine (no units)   Date Value   09/15/2023 >=1.030   10/09/2020 1.020     pH Urine   Date Value   09/15/2023 5.5   10/09/2020 6.0 pH     Protein Albumin Urine (mg/dL)   Date Value   09/15/2023 Negative   10/09/2020 Trace (A)     Urobilinogen Urine   Date Value   09/15/2023 0.2 E.U./dL   10/09/2020 0.2 EU/dL     Nitrite Urine (no units)   Date Value   09/15/2023 Negative   10/09/2020 Negative     Leukocyte Esterase Urine (no units)   Date Value   09/15/2023 Negative   10/09/2020 Negative

## 2024-11-09 ENCOUNTER — HEALTH MAINTENANCE LETTER (OUTPATIENT)
Age: 64
End: 2024-11-09

## 2025-03-25 ENCOUNTER — OFFICE VISIT (OUTPATIENT)
Dept: UROLOGY | Facility: CLINIC | Age: 65
End: 2025-03-25
Payer: COMMERCIAL

## 2025-03-25 VITALS
BODY MASS INDEX: 24.25 KG/M2 | DIASTOLIC BLOOD PRESSURE: 70 MMHG | SYSTOLIC BLOOD PRESSURE: 142 MMHG | WEIGHT: 160 LBS | HEIGHT: 68 IN

## 2025-03-25 DIAGNOSIS — N20.0 KIDNEY STONE: Primary | ICD-10-CM

## 2025-03-25 PROCEDURE — 3077F SYST BP >= 140 MM HG: CPT | Performed by: NURSE PRACTITIONER

## 2025-03-25 PROCEDURE — 3078F DIAST BP <80 MM HG: CPT | Performed by: NURSE PRACTITIONER

## 2025-03-25 PROCEDURE — 1126F AMNT PAIN NOTED NONE PRSNT: CPT | Performed by: NURSE PRACTITIONER

## 2025-03-25 PROCEDURE — 99213 OFFICE O/P EST LOW 20 MIN: CPT | Performed by: NURSE PRACTITIONER

## 2025-03-25 ASSESSMENT — PAIN SCALES - GENERAL: PAINLEVEL_OUTOF10: NO PAIN (0)

## 2025-03-25 NOTE — PROGRESS NOTES
Urology Outpatient Visit    Name: Ryder Zepeda    MRN: [id number]   YOB: 1960               Chief Complaint:   Nephrolithiasis         Impression and Plan:   Impression / Plan:   Ryder Zepeda is a 64 year old male with Hx of nephrolithiasis with small stable nonobstructing nephrolithiasis.       -Discussed watchful waiting vs active surveillance vs procedural intervention. We discussed pros and cons of each option. After shared decision making process, plan for XR KUB in 1 yr to monitor stone burden.   -We discussed increasing citrate via increased intake of citrus fruits.    Thank you for the opportunity to participate in the care of Ryder Zepeda.     Autumn Turcios, APRN, CNP  [facility] - Department of Urology  [phone]          History of Present Illness:   Ryder Zepeda is a 64 year old male with Hx of nephrolithiasis, Hx cysto & ESWL 10/26/23, cysto showed 3 cm prostatic urethra, moderate bilobar prostatic hypertrophy, mild trabeculation, no concerning bladder lesions, no bladder stones.     He underwent litholink 1/12/24 and only had mild hypocitraturia as stone forming risk factor.     Here to review recent XR KUB which demonstrates stable small left renal calculi measuring 2-3 mm and stable small right renal calculus measuring 2 mm. He has not had any passed any stones over the past yr.    History is obtained from patient & EMR    Pertinent imaging reviewed:  EXAM: XR KUB  LOCATION: [facility]  DATE: 3/20/2025     INDICATION:  History of nephrolithiasis  COMPARISON: 624                                                                   IMPRESSION: Stable small left renal calculi measuring 2-3 mm. Stable small right renal calculus measuring 2 mm. Nonobstructive bowel gas pattern. Small amount of formed stool in the colon.          Past Medical History:     Past Medical History:   Diagnosis Date    Complication of anesthesia     unknown, per  "patient \"I woke up with a bunch of people surrounding me and an elevated heart rate\"    Renal disease     Kidney Stones    Shingles           Past Surgical History:     Past Surgical History:   Procedure Laterality Date    COLONOSCOPY      COMBINED CYSTOSCOPY, RETROGRADES, EXCHANGE STENT URETER(S) Left 10/26/2023    Procedure: Cystoscopy with left retrograde pyelogram and left ureteral stent placement, fluoroscopic interpretation <1 hour physician time;  Surgeon: Omari Melvin MD;  Location: [facility] OR    deviated septum      EXTRACORPOREAL SHOCK WAVE LITHOTRIPSY, CYSTOSCOPY, INSERT STENT URETER(S), COMBINED Bilateral 10/26/2023    Procedure: Bilateral extracorporeal shockwave lithotripsy;  Surgeon: Omari Melvin MD;  Location: [facility] OR          Social History:     Social History     Tobacco Use    Smoking status: Never    Smokeless tobacco: Never   Substance Use Topics    Alcohol use: Yes     Comment: Once a week          Family History:     Family History   Problem Relation Age of Onset    Leukemia Mother     Hypertension Father           Allergies:   No Known Allergies       Medications:     Current Outpatient Medications   Medication Sig Dispense Refill    Lansoprazole (PREVACID PO) Take 1 tablet by mouth daily as needed      multivitamin, therapeutic (THERA-VIT) TABS tablet Take 1 tablet by mouth daily      acetaminophen (TYLENOL) 500 MG tablet Take 2 tablets (1,000 mg) by mouth every 6 hours as needed for mild pain (Patient not taking: Reported on 3/25/2025) 100 tablet 0    docusate sodium (COLACE) 100 MG capsule Take 1 capsule (100 mg) by mouth 2 times daily (Patient not taking: Reported on 11/2/2023) 30 capsule 0    pseudoePHEDrine (SUDAFED) 30 MG tablet Take by mouth every 4 hours as needed for congestion (Patient not taking: Reported on 3/25/2025)      tamsulosin (FLOMAX) 0.4 MG capsule Take 1 capsule (0.4 mg) by mouth daily While the stent is in place, for stent pain and irritation (Patient not taking: " Reported on 3/25/2025) 14 capsule 0     No current facility-administered medications for this visit.          Review of Systems:    ROS: See HPI for pertinent details.  Remainder of 10-point ROS negative.         Physical Exam:   VS:  T: Data Unavailable    HR: Data Unavailable    BP: Data Unavailable    RR: Data Unavailable     GEN:  Alert.  NAD.   HEENT:  Sclerae anicteric.    CV:  No obvious jugular venous distension.  LUNGS: No respiratory distress, breathing comfortably wo accessory muscle use.  ABD:  ND.     SKIN:  Dry. No visible rashes.   NEURO:  CN grossly intact.          Laboratory Data:     Lab Results   Component Value Date    PSA 1.18 10/17/2023    PSA 1.41 10/09/2020     Lab Results   Component Value Date    CR 0.91 10/17/2023    CR 0.94 10/09/2020    CR 1.14 12/04/2019    CR 0.92 03/17/2007     Lab Results   Component Value Date    GFRESTIMATED >90 10/17/2023    GFRESTIMATED 87 10/09/2020    GFRESTIMATED 70 12/04/2019    GFRESTIMATED >90 03/17/2007

## 2025-03-25 NOTE — LETTER
3/25/2025       RE: Ryder Zepeda  [address] [place] [id number]     Dear Colleague,    Thank you for referring your patient, Ryder Zepeda, to the [facility] [place] at [facility]. Please see a copy of my visit note below.      Urology Outpatient Visit    Name: Ryder Zepeda    MRN: [id number]   YOB: 1960               Chief Complaint:   Nephrolithiasis         Impression and Plan:   Impression / Plan:   Ryder Zepeda is a 64 year old male with Hx of nephrolithiasis with small stable nonobstructing nephrolithiasis.       -Discussed watchful waiting vs active surveillance vs procedural intervention. We discussed pros and cons of each option. After shared decision making process, plan for XR KUB in 1 yr to monitor stone burden.   -We discussed increasing citrate via increased intake of citrus fruits.    Thank you for the opportunity to participate in the care of Ryder Zepeda.     MAYANK Dallas, CNP   [facility] - Department of Urology  [phone]          History of Present Illness:   Ryder Zepeda is a 64 year old male with Hx of nephrolithiasis, Hx cysto & ESWL 10/26/23, cysto showed 3 cm prostatic urethra, moderate bilobar prostatic hypertrophy, mild trabeculation, no concerning bladder lesions, no bladder stones.     He underwent litholink 1/12/24 and only had mild hypocitraturia as stone forming risk factor.     Here to review recent XR KUB which demonstrates stable small left renal calculi measuring 2-3 mm and stable small right renal calculus measuring 2 mm. He has not had any passed any stones over the past yr.    History is obtained from patient & EMR    Pertinent imaging reviewed:  EXAM: XR KUB  LOCATION: [facility] [place]  DATE: 3/20/2025     INDICATION:  History of nephrolithiasis  COMPARISON: 624                                                                  "  IMPRESSION: Stable small left renal calculi measuring 2-3 mm. Stable small right renal calculus measuring 2 mm. Nonobstructive bowel gas pattern. Small amount of formed stool in the colon.          Past Medical History:     Past Medical History:   Diagnosis Date     Complication of anesthesia     unknown, per patient \"I woke up with a bunch of people surrounding me and an elevated heart rate\"     Renal disease     Kidney Stones     Shingles           Past Surgical History:     Past Surgical History:   Procedure Laterality Date     COLONOSCOPY       COMBINED CYSTOSCOPY, RETROGRADES, EXCHANGE STENT URETER(S) Left 10/26/2023    Procedure: Cystoscopy with left retrograde pyelogram and left ureteral stent placement, fluoroscopic interpretation <1 hour physician time;  Surgeon: Omari Melvin MD;  Location: [facility] OR     deviated septum       EXTRACORPOREAL SHOCK WAVE LITHOTRIPSY, CYSTOSCOPY, INSERT STENT URETER(S), COMBINED Bilateral 10/26/2023    Procedure: Bilateral extracorporeal shockwave lithotripsy;  Surgeon: Omari Melvin MD;  Location: [facility] OR          Social History:     Social History     Tobacco Use     Smoking status: Never     Smokeless tobacco: Never   Substance Use Topics     Alcohol use: Yes     Comment: Once a week          Family History:     Family History   Problem Relation Age of Onset     Leukemia Mother      Hypertension Father           Allergies:   No Known Allergies       Medications:     Current Outpatient Medications   Medication Sig Dispense Refill     Lansoprazole (PREVACID PO) Take 1 tablet by mouth daily as needed       multivitamin, therapeutic (THERA-VIT) TABS tablet Take 1 tablet by mouth daily       acetaminophen (TYLENOL) 500 MG tablet Take 2 tablets (1,000 mg) by mouth every 6 hours as needed for mild pain (Patient not taking: Reported on 3/25/2025) 100 tablet 0     docusate sodium (COLACE) 100 MG capsule Take 1 capsule (100 mg) by mouth 2 times daily (Patient not taking: Reported " on 11/2/2023) 30 capsule 0     pseudoePHEDrine (SUDAFED) 30 MG tablet Take by mouth every 4 hours as needed for congestion (Patient not taking: Reported on 3/25/2025)       tamsulosin (FLOMAX) 0.4 MG capsule Take 1 capsule (0.4 mg) by mouth daily While the stent is in place, for stent pain and irritation (Patient not taking: Reported on 3/25/2025) 14 capsule 0     No current facility-administered medications for this visit.          Review of Systems:    ROS: See HPI for pertinent details.  Remainder of 10-point ROS negative.         Physical Exam:   VS:  T: Data Unavailable    HR: Data Unavailable    BP: Data Unavailable    RR: Data Unavailable     GEN:  Alert.  NAD.   HEENT:  Sclerae anicteric.    CV:  No obvious jugular venous distension.  LUNGS: No respiratory distress, breathing comfortably wo accessory muscle use.  ABD:  ND.     SKIN:  Dry. No visible rashes.   NEURO:  CN grossly intact.          Laboratory Data:     Lab Results   Component Value Date    PSA 1.18 10/17/2023    PSA 1.41 10/09/2020     Lab Results   Component Value Date    CR 0.91 10/17/2023    CR 0.94 10/09/2020    CR 1.14 12/04/2019    CR 0.92 03/17/2007     Lab Results   Component Value Date    GFRESTIMATED >90 10/17/2023    GFRESTIMATED 87 10/09/2020    GFRESTIMATED 70 12/04/2019    GFRESTIMATED >90 03/17/2007            Again, thank you for allowing me to participate in the care of your patient.      Sincerely,    Autumn Turcios, MAYANK CNP

## 2025-03-25 NOTE — NURSING NOTE
Chief Complaint   Patient presents with    Kidney Stone Related     Review KUB     Pt has no concerns with urination.  Pt denies gross hematuria or dysuria, or flank pain.    Oliva Peraza, Clinic Assistant

## 2025-03-27 ENCOUNTER — OFFICE VISIT (OUTPATIENT)
Dept: FAMILY MEDICINE | Facility: CLINIC | Age: 65
End: 2025-03-27
Payer: COMMERCIAL

## 2025-03-27 VITALS
HEART RATE: 60 BPM | HEIGHT: 68 IN | DIASTOLIC BLOOD PRESSURE: 78 MMHG | WEIGHT: 159 LBS | OXYGEN SATURATION: 96 % | BODY MASS INDEX: 24.1 KG/M2 | TEMPERATURE: 97.7 F | RESPIRATION RATE: 12 BRPM | SYSTOLIC BLOOD PRESSURE: 126 MMHG

## 2025-03-27 DIAGNOSIS — B35.1 ONYCHOMYCOSIS: Primary | ICD-10-CM

## 2025-03-27 DIAGNOSIS — H93.11 TINNITUS, RIGHT: ICD-10-CM

## 2025-03-27 DIAGNOSIS — Z13.1 SCREENING FOR DIABETES MELLITUS: ICD-10-CM

## 2025-03-27 DIAGNOSIS — E78.5 HYPERLIPIDEMIA, UNSPECIFIED HYPERLIPIDEMIA TYPE: ICD-10-CM

## 2025-03-27 DIAGNOSIS — Z12.5 SCREENING FOR PROSTATE CANCER: ICD-10-CM

## 2025-03-27 DIAGNOSIS — N20.0 KIDNEY STONE: ICD-10-CM

## 2025-03-27 LAB
ALBUMIN SERPL BCG-MCNC: 4.3 G/DL (ref 3.5–5.2)
ALBUMIN UR-MCNC: NEGATIVE MG/DL
ALP SERPL-CCNC: 79 U/L (ref 40–150)
ALT SERPL W P-5'-P-CCNC: 16 U/L (ref 0–70)
ANION GAP SERPL CALCULATED.3IONS-SCNC: 11 MMOL/L (ref 7–15)
APPEARANCE UR: CLEAR
AST SERPL W P-5'-P-CCNC: 23 U/L (ref 0–45)
BILIRUB SERPL-MCNC: 0.2 MG/DL
BILIRUB UR QL STRIP: NEGATIVE
BUN SERPL-MCNC: 12 MG/DL (ref 8–23)
CALCIUM SERPL-MCNC: 9.7 MG/DL (ref 8.8–10.4)
CHLORIDE SERPL-SCNC: 104 MMOL/L (ref 98–107)
CHOLEST SERPL-MCNC: 228 MG/DL
COLOR UR AUTO: YELLOW
CREAT SERPL-MCNC: 0.92 MG/DL (ref 0.67–1.17)
EGFRCR SERPLBLD CKD-EPI 2021: >90 ML/MIN/1.73M2
FASTING STATUS PATIENT QL REPORTED: YES
FASTING STATUS PATIENT QL REPORTED: YES
GLUCOSE SERPL-MCNC: 95 MG/DL (ref 70–99)
GLUCOSE UR STRIP-MCNC: NEGATIVE MG/DL
HCO3 SERPL-SCNC: 26 MMOL/L (ref 22–29)
HDLC SERPL-MCNC: 51 MG/DL
HGB UR QL STRIP: NEGATIVE
KETONES UR STRIP-MCNC: NEGATIVE MG/DL
LDLC SERPL CALC-MCNC: 158 MG/DL
LEUKOCYTE ESTERASE UR QL STRIP: NEGATIVE
NITRATE UR QL: NEGATIVE
NONHDLC SERPL-MCNC: 177 MG/DL
PH UR STRIP: 7 [PH] (ref 5–7)
POTASSIUM SERPL-SCNC: 4.2 MMOL/L (ref 3.4–5.3)
PROT SERPL-MCNC: 7.4 G/DL (ref 6.4–8.3)
PSA SERPL DL<=0.01 NG/ML-MCNC: 1.24 NG/ML (ref 0–4.5)
SODIUM SERPL-SCNC: 141 MMOL/L (ref 135–145)
SP GR UR STRIP: 1.02 (ref 1–1.03)
TRIGL SERPL-MCNC: 95 MG/DL
UROBILINOGEN UR STRIP-ACNC: 0.2 E.U./DL

## 2025-03-27 RX ORDER — TERBINAFINE HYDROCHLORIDE 250 MG/1
250 TABLET ORAL DAILY
Qty: 90 TABLET | Refills: 0 | Status: SHIPPED | OUTPATIENT
Start: 2025-03-27 | End: 2025-06-25

## 2025-03-27 ASSESSMENT — PAIN SCALES - GENERAL: PAINLEVEL_OUTOF10: NO PAIN (0)

## 2025-03-27 NOTE — PROGRESS NOTES
Assessment & Plan     Onychomycosis  Start oral terbinafine. Check LFTs today and again in 6 weeks. Follow up in 6-12 months if not improving.  - terbinafine (LAMISIL) 250 MG tablet; Take 1 tablet (250 mg) by mouth daily.  - Hepatic panel (Albumin, ALT, AST, Bili, Alk Phos, TP); Future    Hyperlipidemia, unspecified hyperlipidemia type  - Lipid panel reflex to direct LDL Fasting; Future  - Lipid panel reflex to direct LDL Fasting    Screening for prostate cancer  - PSA, screen; Future  - PSA, screen    Screening for diabetes mellitus  - Comprehensive metabolic panel (BMP + Alb, Alk Phos, ALT, AST, Total. Bili, TP); Future  - Comprehensive metabolic panel (BMP + Alb, Alk Phos, ALT, AST, Total. Bili, TP)    Tinnitus, right  Normal exam and intermittent. May be related to blood pressure changes or sinus issues with seasonal allergies. No other associated symptoms. If any persistence or worsening, consider head imaging to rule out vascular abnormalities.    Kidney stone  - UA without Microscopic [[id number]]        Calli Soler is a 64 year old, presenting for the following health issues:  RECHECK        3/27/2025     6:56 AM   Additional Questions   Roomed by JUDY ALFARO CMA   Accompanied by SELF       History of Present Illness       Reason for visit:  Toes    He eats 0-1 servings of fruits and vegetables daily.He consumes 1 sweetened beverage(s) daily.He exercises with enough effort to increase his heart rate 9 or less minutes per day.  He exercises with enough effort to increase his heart rate 3 or less days per week.   He is taking medications regularly.      LOV was 10/17/23 for Preop     Had an infections on toes on Lt foot - still not going away was given an Rx a while ago (minor relief) unsure of the name , toenails are thickening and small wondering if it is a fungus     Pt also notes waking 2-3 times a night to use the bathroom. Urine flow is slower    For the past month, has noticed hearing his pulse in  "his right ear. Has occurred half the nights over the past month. No ear pain or otorrhea. No headaches, lightheadedness, dizziness. He has dealt with some seasonal allergy symptoms but this is not new for him.              Review of Systems  Constitutional, HEENT, cardiovascular, pulmonary, gi and gu systems are negative, except as otherwise noted.      Objective    /78   Pulse 60   Temp 97.7  F (36.5  C) (Tympanic)   Resp 12   Ht 1.727 m (5' 8\")   Wt 72.1 kg (159 lb)   SpO2 96%   BMI 24.18 kg/m    Body mass index is 24.18 kg/m .  Physical Exam   GENERAL: alert and no distress  HENT: ear canals and TM's normal, nose and mouth without ulcers or lesions  NECK: no adenopathy, no asymmetry, masses, or scars, thyroid normal to palpation, and no carotid bruits  RESP: lungs clear to auscultation - no rales, rhonchi or wheezes  CV: regular rates and rhythm, normal S1 S2, no S3 or S4, and no murmur, click or rub  SKIN: no suspicious lesions or rashes and thickened discolored toenails on left foot  PSYCH: mentation appears normal, affect normal/bright    The longitudinal plan of care for the diagnosis(es)/condition(s) as documented were addressed during this visit. Due to the added complexity in care, I will continue to support Ryder in the subsequent management and with ongoing continuity of care.          Signed Electronically by: Faheem Penn DO    "

## 2025-06-21 ENCOUNTER — HEALTH MAINTENANCE LETTER (OUTPATIENT)
Age: 65
End: 2025-06-21

## 2025-07-10 ENCOUNTER — PATIENT OUTREACH (OUTPATIENT)
Dept: CARE COORDINATION | Facility: CLINIC | Age: 65
End: 2025-07-10

## 2025-07-10 ENCOUNTER — OFFICE VISIT (OUTPATIENT)
Dept: FAMILY MEDICINE | Facility: CLINIC | Age: 65
End: 2025-07-10
Payer: COMMERCIAL

## 2025-07-10 VITALS
OXYGEN SATURATION: 99 % | HEIGHT: 68 IN | TEMPERATURE: 97.4 F | HEART RATE: 62 BPM | SYSTOLIC BLOOD PRESSURE: 128 MMHG | BODY MASS INDEX: 23.95 KG/M2 | RESPIRATION RATE: 12 BRPM | DIASTOLIC BLOOD PRESSURE: 74 MMHG | WEIGHT: 158 LBS

## 2025-07-10 DIAGNOSIS — Z00.00 ROUTINE GENERAL MEDICAL EXAMINATION AT A HEALTH CARE FACILITY: Primary | ICD-10-CM

## 2025-07-10 SDOH — HEALTH STABILITY: PHYSICAL HEALTH: ON AVERAGE, HOW MANY DAYS PER WEEK DO YOU ENGAGE IN MODERATE TO STRENUOUS EXERCISE (LIKE A BRISK WALK)?: 2 DAYS

## 2025-07-10 ASSESSMENT — PAIN SCALES - GENERAL: PAINLEVEL_OUTOF10: NO PAIN (0)

## 2025-07-10 ASSESSMENT — SOCIAL DETERMINANTS OF HEALTH (SDOH): HOW OFTEN DO YOU GET TOGETHER WITH FRIENDS OR RELATIVES?: THREE TIMES A WEEK

## 2025-07-10 NOTE — PROGRESS NOTES
Preventive Care Visit  [facility] [place]  Faheem Penn DO, Family Medicine  Jul 10, 2025      Assessment & Plan     Routine general medical examination at a health care facility  Health maintenance reviewed and updated. Emphasized importance of balanced diet and regular exercise.    Counseling  Appropriate preventive services were addressed with this patient via screening, questionnaire, or discussion as appropriate for fall prevention, nutrition, physical activity, Tobacco-use cessation, social engagement, weight loss and cognition.  Checklist reviewing preventive services available has been given to the patient.  Reviewed patient's diet, addressing concerns and/or questions.   He is at risk for lack of exercise and has been provided with information to increase physical activity for the benefit of his well-being.   Reviewed preventive health counseling, as reflected in patient instructions      Subjective   Ryder is a 65 year old, presenting for the following:  Medicare Visit        7/10/2025     9:12 AM   Additional Questions   Roomed by JUDY Viera CMA   Accompanied by SELF        HPI     Following up from 3/27/25 visit, labs/Lamisil 250 mg - seems to be getting better but still noticeable       Advance Care Planning    Patient states has Health Care Directive and will send to [employer].        7/10/2025   General Health   How would you rate your overall physical health? Good   Feel stress (tense, anxious, or unable to sleep) Only a little         7/10/2025   Nutrition   Diet: Regular (no restrictions)         7/10/2025   Exercise   Days per week of moderate/strenous exercise 2 days         7/10/2025   Social Factors   Frequency of gathering with friends or relatives Three times a week   Worry food won't last until get money to buy more No   Food not last or not have enough money for food? No   Do you have housing? (Housing is defined as stable permanent housing and does not include  staying outside in a car, in a tent, in an abandoned building, in an overnight shelter, or couch-surfing.) Yes   Are you worried about losing your housing? No   Lack of transportation? No   Unable to get utilities (heat,electricity)? No         7/10/2025   Fall Risk   Fallen 2 or more times in the past year? No   Trouble with walking or balance? No          7/10/2025   Activities of Daily Living- Home Safety   Needs help with the following daily activites None of the above   Safety concerns in the home None of the above         7/10/2025   Dental   Dentist two times every year? Yes         7/10/2025   Hearing Screening   Hearing concerns? None of the above         7/10/2025   Driving Risk Screening   Patient/family members have concerns about driving No         7/10/2025   General Alertness/Fatigue Screening   Have you been more tired than usual lately? No         7/10/2025   Urinary Incontinence Screening   Bothered by leaking urine in past 6 months No     Today's PHQ-2 Score:       7/10/2025     9:09 AM   PHQ-2 ( 1999 [employer])   Q1: Little interest or pleasure in doing things 0   Q2: Feeling down, depressed or hopeless 0   PHQ-2 Score 0    Q1: Little interest or pleasure in doing things Not at all   Q2: Feeling down, depressed or hopeless Not at all   PHQ-2 Score 0       Patient-reported           7/10/2025   Substance Use   Alcohol more than 3/day or more than 7/wk No   Do you have a current opioid prescription? No   How severe/bad is pain from 1 to 10? 1/10   Do you use any other substances recreationally? No     Social History     Tobacco Use    Smoking status: Never     Passive exposure: Never    Smokeless tobacco: Never   Vaping Use    Vaping status: Never Used   Substance Use Topics    Alcohol use: Yes     Comment: Once a week    Drug use: No           7/10/2025   AAA Screening   Family history of Abdominal Aortic Aneurysm (AAA)? No   Last PSA:   PSA   Date Value Ref Range Status   10/09/2020 1.41 0 - 4 ug/L  Final     Comment:     Assay Method:  Chemiluminescence using Siemens Vista analyzer     Prostate Specific Antigen Screen   Date Value Ref Range Status   03/27/2025 1.24 0.00 - 4.50 ng/mL Final     ASCVD Risk   The 10-year ASCVD risk score (Catherine SEALS, et al., 2019) is: 13.8%    Values used to calculate the score:      Age: 65 years      Sex: Male      Is Non- : No      Diabetic: No      Tobacco smoker: No      Systolic Blood Pressure: 128 mmHg      Is BP treated: No      HDL Cholesterol: 51 mg/dL      Total Cholesterol: 228 mg/dL      Reviewed and updated as needed this visit by Provider                  Current providers sharing in care for this patient include:  Patient Care Team:  Faheem Penn DO as PCP - General (Family Practice)  Faheem Penn DO as Assigned PCP  Omari Melvin MD as MD (Urology)  Autumn Turcios APRN CNP as Nurse Practitioner (Urology)  Autumn Turcios APRN CNP as Assigned Surgical Provider    The following health maintenance items are reviewed in Epic and correct as of today:  Health Maintenance   Topic Date Due    DTAP/TDAP/TD VACCINE (1 - Tdap) Never done    PNEUMOCOCCAL VACCINE 50+ YEARS (1 of 1 - PCV) Never done    ZOSTER VACCINE (1 of 2) Never done    COVID-19 VACCINE (3 - 2024-25 season) 09/01/2024    MEDICARE ANNUAL WELLNESS VISIT  Never done    INFLUENZA VACCINE (1) 09/01/2025    LIPID  03/27/2026    ANNUAL REVIEW OF HM ORDERS  03/27/2026    FALL RISK ASSESSMENT  07/10/2026    DIABETES SCREENING  03/27/2028    COLORECTAL CANCER SCREENING  02/03/2030    ADVANCE CARE PLANNING  07/10/2030    RSV VACCINE (1 - 1-dose 75+ series) 05/11/2035    PHQ-2 (once per calendar year)  Completed    HPV VACCINE  Aged Out    MENINGITIS VACCINE  Aged Out    HEPATITIS C SCREENING  Discontinued    HIV SCREENING  Discontinued         Review of Systems  Constitutional, HEENT, cardiovascular, pulmonary, gi and gu systems are negative, except as otherwise  "noted.     Objective    Exam  /74   Pulse 62   Temp 97.4  F (36.3  C) (Tympanic)   Resp 12   Ht 1.727 m (5' 8\")   Wt 71.7 kg (158 lb)   SpO2 99%   BMI 24.02 kg/m     Estimated body mass index is 24.02 kg/m  as calculated from the following:    Height as of this encounter: 1.727 m (5' 8\").    Weight as of this encounter: 71.7 kg (158 lb).    Physical Exam  GENERAL: alert and no distress  EYES: Eyes grossly normal to inspection  HENT: ear canals and TM's normal, nose and mouth without ulcers or lesions  NECK: no adenopathy, no asymmetry, masses, or scars  RESP: lungs clear to auscultation - no rales, rhonchi or wheezes  CV: regular rates and rhythm, normal S1 S2, no S3 or S4, and no murmur, click or rub  MS: no gross musculoskeletal defects noted, no edema  PSYCH: mentation appears normal, affect normal/bright         7/10/2025   Mini Cog   Clock Draw Score 2 Normal   3 Item Recall 3 objects recalled   Mini Cog Total Score 5             Signed Electronically by: Faheem Penn DO    "

## 2025-07-10 NOTE — PATIENT INSTRUCTIONS
Patient Education   Preventive Care Advice   This is general advice given by our system to help you stay healthy. However, your care team may have specific advice just for you. Please talk to your care team about your preventive care needs.  Nutrition  Eat 5 or more servings of fruits and vegetables each day.  Try wheat bread, brown rice and whole grain pasta (instead of white bread, rice, and pasta).  Get enough calcium and vitamin D. Check the label on foods and aim for 100% of the RDA (recommended daily allowance).  Lifestyle  Exercise at least 150 minutes each week  (30 minutes a day, 5 days a week).  Do muscle strengthening activities 2 days a week. These help control your weight and prevent disease.  No smoking.  Wear sunscreen to prevent skin cancer.  Have a dental exam and cleaning every 6 months.  Yearly exams  See your health care team every year to talk about:  Any changes in your health.  Any medicines your care team has prescribed.  Preventive care, family planning, and ways to prevent chronic diseases.  Shots (vaccines)   HPV shots (up to age 26), if you've never had them before.  Hepatitis B shots (up to age 59), if you've never had them before.  COVID-19 shot: Get this shot when it's due.  Flu shot: Get a flu shot every year.  Tetanus shot: Get a tetanus shot every 10 years.  Pneumococcal, hepatitis A, and RSV shots: Ask your care team if you need these based on your risk.  Shingles shot (for age 50 and up)  General health tests  Diabetes screening:  Starting at age 35, Get screened for diabetes at least every 3 years.  If you are younger than age 35, ask your care team if you should be screened for diabetes.  Cholesterol test: At age 39, start having a cholesterol test every 5 years, or more often if advised.  Bone density scan (DEXA): At age 50, ask your care team if you should have this scan for osteoporosis (brittle bones).  Hepatitis C: Get tested at least once in your life.  STIs (sexually  transmitted infections)  Before age 24: Ask your care team if you should be screened for STIs.  After age 24: Get screened for STIs if you're at risk. You are at risk for STIs (including HIV) if:  You are sexually active with more than one person.  You don't use condoms every time.  You or a partner was diagnosed with a sexually transmitted infection.  If you are at risk for HIV, ask about PrEP medicine to prevent HIV.  Get tested for HIV at least once in your life, whether you are at risk for HIV or not.  Cancer screening tests  Cervical cancer screening: If you have a cervix, begin getting regular cervical cancer screening tests starting at age 21.  Breast cancer scan (mammogram): If you've ever had breasts, begin having regular mammograms starting at age 40. This is a scan to check for breast cancer.  Colon cancer screening: It is important to start screening for colon cancer at age 45.  Have a colonoscopy test every 10 years (or more often if you're at risk) Or, ask your provider about stool tests like a FIT test every year or Cologuard test every 3 years.  To learn more about your testing options, visit:   .  For help making a decision, visit:   https://bit.ly/xv48561.  Prostate cancer screening test: If you have a prostate, ask your care team if a prostate cancer screening test (PSA) at age 55 is right for you.  Lung cancer screening: If you are a current or former smoker ages 50 to 80, ask your care team if ongoing lung cancer screenings are right for you.  For informational purposes only. Not to replace the advice of your health care provider. Copyright   2023 [employer]. All rights reserved. Clinically reviewed by the [facility]. [place] [id number] - REV 01/24.

## (undated) DEVICE — LINEN HALF SHEET 5512

## (undated) DEVICE — TRAY PREP DRY SKIN SCRUB 067

## (undated) DEVICE — LINEN FULL SHEET 5511

## (undated) DEVICE — RAD RX ISOVUE 300 (50ML) 61% IOPAMIDOL CHARGE PER ML

## (undated) DEVICE — TUBING IRRIG CYSTO/BLADDER SET 81" LF 2C4040

## (undated) DEVICE — LINEN TOWEL PACK X10 5473

## (undated) DEVICE — CATH TRAY FOLEY COUDE SURESTEP 16FR W/DRN BAG LATEX A304416A

## (undated) DEVICE — PAD CHUX UNDERPAD 30X36" P3036C

## (undated) DEVICE — GUIDEWIRE URO STR STIFF .035"X150CM NITINOL 150NSS35

## (undated) DEVICE — GLOVE BIOGEL PI MICRO INDICATOR UNDERGLOVE SZ 7.0 48970

## (undated) DEVICE — SOL WATER IRRIG 1000ML BOTTLE 2F7114

## (undated) DEVICE — SOL NACL 0.9% IRRIG 3000ML BAG 2B7477

## (undated) DEVICE — TUBING SUCTION 12"X1/4" N612

## (undated) DEVICE — GLOVE BIOGEL PI MICRO SZ 7.0 48570

## (undated) DEVICE — CATH URETERAL FLEX TIP TIGERTAIL 06FRX70CM 139006

## (undated) DEVICE — BAG CLEAR TRASH 1.3M 39X33" P4040C

## (undated) DEVICE — PACK CYSTO CUSTOM RIDGES

## (undated) DEVICE — TUBING IRRIG TUR Y TYPE 96" LF 6543-01

## (undated) DEVICE — PREP SCRUB SOL EXIDINE 4% CHG 4OZ 29002-404

## (undated) DEVICE — MANIFOLD NEPTUNE 4 PORT 700-20

## (undated) RX ORDER — LIDOCAINE HYDROCHLORIDE 10 MG/ML
INJECTION, SOLUTION EPIDURAL; INFILTRATION; INTRACAUDAL; PERINEURAL
Status: DISPENSED
Start: 2023-10-26

## (undated) RX ORDER — GLYCOPYRROLATE 0.2 MG/ML
INJECTION INTRAMUSCULAR; INTRAVENOUS
Status: DISPENSED
Start: 2023-10-26

## (undated) RX ORDER — FENTANYL CITRATE 50 UG/ML
INJECTION, SOLUTION INTRAMUSCULAR; INTRAVENOUS
Status: DISPENSED
Start: 2023-10-26

## (undated) RX ORDER — PHENYLEPHRINE HYDROCHLORIDE 10 MG/ML
INJECTION INTRAVENOUS
Status: DISPENSED
Start: 2023-10-26

## (undated) RX ORDER — DEXAMETHASONE SODIUM PHOSPHATE 4 MG/ML
INJECTION, SOLUTION INTRA-ARTICULAR; INTRALESIONAL; INTRAMUSCULAR; INTRAVENOUS; SOFT TISSUE
Status: DISPENSED
Start: 2023-10-26

## (undated) RX ORDER — FENTANYL CITRATE-0.9 % NACL/PF 10 MCG/ML
PLASTIC BAG, INJECTION (ML) INTRAVENOUS
Status: DISPENSED
Start: 2023-10-26

## (undated) RX ORDER — PROPOFOL 10 MG/ML
INJECTION, EMULSION INTRAVENOUS
Status: DISPENSED
Start: 2023-10-26

## (undated) RX ORDER — VASOPRESSIN 20 U/ML
INJECTION PARENTERAL
Status: DISPENSED
Start: 2023-10-26

## (undated) RX ORDER — CEFAZOLIN SODIUM/WATER 2 G/20 ML
SYRINGE (ML) INTRAVENOUS
Status: DISPENSED
Start: 2023-10-26